# Patient Record
Sex: MALE | Race: WHITE | NOT HISPANIC OR LATINO | URBAN - METROPOLITAN AREA
[De-identification: names, ages, dates, MRNs, and addresses within clinical notes are randomized per-mention and may not be internally consistent; named-entity substitution may affect disease eponyms.]

---

## 2020-03-24 ENCOUNTER — INPATIENT (INPATIENT)
Facility: HOSPITAL | Age: 55
LOS: 9 days | Discharge: HOME | DRG: 177 | End: 2020-04-03
Attending: INTERNAL MEDICINE | Admitting: HOSPITALIST
Payer: COMMERCIAL

## 2020-03-24 VITALS
TEMPERATURE: 100 F | RESPIRATION RATE: 24 BRPM | HEART RATE: 113 BPM | WEIGHT: 315 LBS | HEIGHT: 68 IN | DIASTOLIC BLOOD PRESSURE: 89 MMHG | SYSTOLIC BLOOD PRESSURE: 131 MMHG | OXYGEN SATURATION: 88 %

## 2020-03-24 DIAGNOSIS — Z02.9 ENCOUNTER FOR ADMINISTRATIVE EXAMINATIONS, UNSPECIFIED: ICD-10-CM

## 2020-03-24 DIAGNOSIS — I10 ESSENTIAL (PRIMARY) HYPERTENSION: ICD-10-CM

## 2020-03-24 DIAGNOSIS — J96.01 ACUTE RESPIRATORY FAILURE WITH HYPOXIA: ICD-10-CM

## 2020-03-24 DIAGNOSIS — B34.2 CORONAVIRUS INFECTION, UNSPECIFIED: ICD-10-CM

## 2020-03-24 DIAGNOSIS — J18.9 PNEUMONIA, UNSPECIFIED ORGANISM: ICD-10-CM

## 2020-03-24 DIAGNOSIS — E66.01 MORBID (SEVERE) OBESITY DUE TO EXCESS CALORIES: ICD-10-CM

## 2020-03-24 DIAGNOSIS — R63.8 OTHER SYMPTOMS AND SIGNS CONCERNING FOOD AND FLUID INTAKE: ICD-10-CM

## 2020-03-24 DIAGNOSIS — J12.9 VIRAL PNEUMONIA, UNSPECIFIED: ICD-10-CM

## 2020-03-24 LAB
ALBUMIN SERPL ELPH-MCNC: 3.7 G/DL — SIGNIFICANT CHANGE UP (ref 3.3–5)
ALP SERPL-CCNC: 65 U/L — SIGNIFICANT CHANGE UP (ref 40–120)
ALT FLD-CCNC: 110 U/L — HIGH (ref 10–45)
ANION GAP SERPL CALC-SCNC: 14 MMOL/L — SIGNIFICANT CHANGE UP (ref 5–17)
AST SERPL-CCNC: 74 U/L — HIGH (ref 10–40)
BASOPHILS # BLD AUTO: 0.01 K/UL — SIGNIFICANT CHANGE UP (ref 0–0.2)
BASOPHILS NFR BLD AUTO: 0.2 % — SIGNIFICANT CHANGE UP (ref 0–2)
BILIRUB SERPL-MCNC: 0.5 MG/DL — SIGNIFICANT CHANGE UP (ref 0.2–1.2)
BUN SERPL-MCNC: 9 MG/DL — SIGNIFICANT CHANGE UP (ref 7–23)
CALCIUM SERPL-MCNC: 8.6 MG/DL — SIGNIFICANT CHANGE UP (ref 8.4–10.5)
CHLORIDE SERPL-SCNC: 98 MMOL/L — SIGNIFICANT CHANGE UP (ref 96–108)
CO2 SERPL-SCNC: 24 MMOL/L — SIGNIFICANT CHANGE UP (ref 22–31)
CREAT SERPL-MCNC: 0.57 MG/DL — SIGNIFICANT CHANGE UP (ref 0.5–1.3)
EOSINOPHIL # BLD AUTO: 0 K/UL — SIGNIFICANT CHANGE UP (ref 0–0.5)
EOSINOPHIL NFR BLD AUTO: 0 % — SIGNIFICANT CHANGE UP (ref 0–6)
GAS PNL BLDV: SIGNIFICANT CHANGE UP
GLUCOSE SERPL-MCNC: 123 MG/DL — HIGH (ref 70–99)
HCT VFR BLD CALC: 41.8 % — SIGNIFICANT CHANGE UP (ref 39–50)
HGB BLD-MCNC: 13.6 G/DL — SIGNIFICANT CHANGE UP (ref 13–17)
IMM GRANULOCYTES NFR BLD AUTO: 0.5 % — SIGNIFICANT CHANGE UP (ref 0–1.5)
LYMPHOCYTES # BLD AUTO: 0.76 K/UL — LOW (ref 1–3.3)
LYMPHOCYTES # BLD AUTO: 12.9 % — LOW (ref 13–44)
MCHC RBC-ENTMCNC: 30.4 PG — SIGNIFICANT CHANGE UP (ref 27–34)
MCHC RBC-ENTMCNC: 32.5 GM/DL — SIGNIFICANT CHANGE UP (ref 32–36)
MCV RBC AUTO: 93.3 FL — SIGNIFICANT CHANGE UP (ref 80–100)
MONOCYTES # BLD AUTO: 0.39 K/UL — SIGNIFICANT CHANGE UP (ref 0–0.9)
MONOCYTES NFR BLD AUTO: 6.6 % — SIGNIFICANT CHANGE UP (ref 2–14)
NEUTROPHILS # BLD AUTO: 4.69 K/UL — SIGNIFICANT CHANGE UP (ref 1.8–7.4)
NEUTROPHILS NFR BLD AUTO: 79.8 % — HIGH (ref 43–77)
NRBC # BLD: 0 /100 WBCS — SIGNIFICANT CHANGE UP (ref 0–0)
PLATELET # BLD AUTO: 156 K/UL — SIGNIFICANT CHANGE UP (ref 150–400)
POTASSIUM SERPL-MCNC: 3.5 MMOL/L — SIGNIFICANT CHANGE UP (ref 3.5–5.3)
POTASSIUM SERPL-SCNC: 3.5 MMOL/L — SIGNIFICANT CHANGE UP (ref 3.5–5.3)
PROT SERPL-MCNC: 7.6 G/DL — SIGNIFICANT CHANGE UP (ref 6–8.3)
RBC # BLD: 4.48 M/UL — SIGNIFICANT CHANGE UP (ref 4.2–5.8)
RBC # FLD: 12.8 % — SIGNIFICANT CHANGE UP (ref 10.3–14.5)
SARS-COV-2 RNA SPEC QL NAA+PROBE: DETECTED
SODIUM SERPL-SCNC: 136 MMOL/L — SIGNIFICANT CHANGE UP (ref 135–145)
WBC # BLD: 5.88 K/UL — SIGNIFICANT CHANGE UP (ref 3.8–10.5)
WBC # FLD AUTO: 5.88 K/UL — SIGNIFICANT CHANGE UP (ref 3.8–10.5)

## 2020-03-24 PROCEDURE — 93010 ELECTROCARDIOGRAM REPORT: CPT

## 2020-03-24 PROCEDURE — 71045 X-RAY EXAM CHEST 1 VIEW: CPT | Mod: 26

## 2020-03-24 PROCEDURE — 99223 1ST HOSP IP/OBS HIGH 75: CPT

## 2020-03-24 PROCEDURE — 99285 EMERGENCY DEPT VISIT HI MDM: CPT

## 2020-03-24 RX ORDER — ACETAMINOPHEN 500 MG
650 TABLET ORAL EVERY 6 HOURS
Refills: 0 | Status: DISCONTINUED | OUTPATIENT
Start: 2020-03-24 | End: 2020-04-03

## 2020-03-24 RX ORDER — AZITHROMYCIN 500 MG/1
500 TABLET, FILM COATED ORAL ONCE
Refills: 0 | Status: DISCONTINUED | OUTPATIENT
Start: 2020-03-24 | End: 2020-03-24

## 2020-03-24 RX ORDER — HYDROXYCHLOROQUINE SULFATE 200 MG
TABLET ORAL
Refills: 0 | Status: COMPLETED | OUTPATIENT
Start: 2020-03-25 | End: 2020-03-29

## 2020-03-24 RX ORDER — HYDROXYCHLOROQUINE SULFATE 200 MG
200 TABLET ORAL EVERY 12 HOURS
Refills: 0 | Status: COMPLETED | OUTPATIENT
Start: 2020-03-26 | End: 2020-03-29

## 2020-03-24 RX ORDER — CEFTRIAXONE 500 MG/1
1000 INJECTION, POWDER, FOR SOLUTION INTRAMUSCULAR; INTRAVENOUS ONCE
Refills: 0 | Status: COMPLETED | OUTPATIENT
Start: 2020-03-24 | End: 2020-03-24

## 2020-03-24 RX ORDER — ENOXAPARIN SODIUM 100 MG/ML
40 INJECTION SUBCUTANEOUS EVERY 12 HOURS
Refills: 0 | Status: DISCONTINUED | OUTPATIENT
Start: 2020-03-24 | End: 2020-04-03

## 2020-03-24 RX ORDER — AMLODIPINE BESYLATE 2.5 MG/1
1 TABLET ORAL
Qty: 0 | Refills: 0 | DISCHARGE

## 2020-03-24 RX ORDER — HYDROXYCHLOROQUINE SULFATE 200 MG
400 TABLET ORAL EVERY 12 HOURS
Refills: 0 | Status: COMPLETED | OUTPATIENT
Start: 2020-03-25 | End: 2020-03-25

## 2020-03-24 RX ORDER — ENOXAPARIN SODIUM 100 MG/ML
40 INJECTION SUBCUTANEOUS DAILY
Refills: 0 | Status: DISCONTINUED | OUTPATIENT
Start: 2020-03-24 | End: 2020-03-24

## 2020-03-24 RX ORDER — LANOLIN ALCOHOL/MO/W.PET/CERES
5 CREAM (GRAM) TOPICAL ONCE
Refills: 0 | Status: COMPLETED | OUTPATIENT
Start: 2020-03-24 | End: 2020-03-24

## 2020-03-24 RX ADMIN — ENOXAPARIN SODIUM 40 MILLIGRAM(S): 100 INJECTION SUBCUTANEOUS at 17:59

## 2020-03-24 RX ADMIN — CEFTRIAXONE 100 MILLIGRAM(S): 500 INJECTION, POWDER, FOR SOLUTION INTRAMUSCULAR; INTRAVENOUS at 14:59

## 2020-03-24 RX ADMIN — Medication 5 MILLIGRAM(S): at 22:25

## 2020-03-24 NOTE — H&P ADULT - PROBLEM SELECTOR PLAN 5
-patient w/ morbid obesity, no formal diagnosis of sleep apnea, does not use any type of mask at home

## 2020-03-24 NOTE — ED PROVIDER NOTE - PHYSICAL EXAMINATION
Kenneth Gorman):  GEN: NAD, morbidly obese, awake, eyes open spontaneously  HEENT: NCAT, MMM, Trachea midline, normal conjunctiva, perrl  CHEST/LUNGS: CTAB, noted to desat to 90% when off supplemental O2, bilateral breath sounds  CARDIAC: Non-tachycardic, normal perfusion  ABDOMEN: Soft, NTND, No rebound/guarding  MSK: No edema, no gross deformity of extremities  SKIN: No rashes, no petechiae, no vesicles  NEURO: CN grossly intact, normal coordination, no focal motor or sensory deficits  PSYCH: Alert, appropriate, cooperative, with capacity and insight

## 2020-03-24 NOTE — PATIENT PROFILE ADULT - NSPROHMSYMPCOND_GEN_A_NUR
Patient ambulated to Y61 with a steady gait. Patient changed into gown and connected to monitor. Chart up for ERP.    cardiovascular

## 2020-03-24 NOTE — ED PROVIDER NOTE - OBJECTIVE STATEMENT
Kenneth Gorman): 54y M with PMHX of Morbid Obesity, HTN presents to ED c/o 5 days of low grade fever (TMAX: 100.0F) with generalized malaise, increasing dry cough, and SOB. Notes that he wanted to initially take the day off of work to recuperate, but started experiencing increasing dyspnea this morning. O2 sat 88% in triage. Currently feeling better with 5L nasal cannula. Denies N/V/D.

## 2020-03-24 NOTE — ED ADULT NURSE REASSESSMENT NOTE - NS ED NURSE REASSESS COMMENT FT1
Pt. laying in bed. VSS/NAD. Abx given as per orders. Pt. denies current CP, SOB, N/V/D, numbness, tingling, weakness, dizziness. Waiting for bed upstairs.

## 2020-03-24 NOTE — H&P ADULT - HISTORY OF PRESENT ILLNESS
54y M with medical hx of Morbid Obesity, HTN presents to ED c/o 5 days of low grade fever (TMAX: 100.0F) with generalized malaise, increasing dry cough, and SOB. He states it started with fevers and chills, and yesterday started getting sob prompting him to come to the ED. Denies CP, abdominal pain, palpitations, changes in bowel habits. He has been isolating himself, lives with his wife. 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 He was a former smoker, quit ten years ago, smoked 1PPD x 15 years. Now he does intermittently vape.     Here in the ED: He was hypoxic to 88%, tachypneic, and tachy, tmax 100.2 on 4L NC

## 2020-03-24 NOTE — ED ADULT NURSE NOTE - OBJECTIVE STATEMENT
Pt. is a 55 y/o male c/o cough, chills, malaise, fever, SOB worsening today. States symptoms have been for 5 days. States he has been self isolating at home for a week, wife is also feeling ill. Came in today for worsening SOB. Denies CP, N/V/D, numbness, tingling, dizziness, weakness, headache. A&Ox3. Breathing unlabored and spontaneous. Abdomen soft, nontender, nondistended. Full ROM and strength of extremities. Skin dry and intact. Safety and comfort measures provided.

## 2020-03-24 NOTE — H&P ADULT - NSHPLABSRESULTS_GEN_ALL_CORE
03-24    136  |  98  |  9   ----------------------------<  123<H>  3.5   |  24  |  0.57    Ca    8.6      24 Mar 2020 12:48    TPro  7.6  /  Alb  3.7  /  TBili  0.5  /  DBili  x   /  AST  74<H>  /  ALT  110<H>  /  AlkPhos  65  03-24                                              13.6   5.88  )-----------( 156      ( 24 Mar 2020 12:48 )             41.8     CAPILLARY BLOOD GLUCOSE        Personally reviewed imaging  < from: Xray Chest 1 View- PORTABLE-Urgent (03.24.20 @ 13:26) >    Findings: The heart size cannot be adequately assessed on this single view. There are patchy airspace opacities in the right lung, likely representing multifocal pneumonia. There are no pleural effusions. The hilar and mediastinal structures appear unremarkable.    Impression: Multifocal pneumonia involving primarily the right lung.    < end of copied text >    EKG pending

## 2020-03-24 NOTE — H&P ADULT - ASSESSMENT
54y M with medical hx of Morbid Obesity, HTN presents to ED c/o 5 days of low grade fever (TMAX: 100.0F) with generalized malaise, increasing dry cough, and SOB, admitted w/ acute hypoxia 2/2 COVID 19

## 2020-03-24 NOTE — ED PROVIDER NOTE - CLINICAL SUMMARY MEDICAL DECISION MAKING FREE TEXT BOX
Kenneth Gorman): 54y morbidly obese M, newly hypoxic on RA, with SOB, CHAO, dry cough, and bodyaches. Likely COVID-19. Will obtain CXR, basic labs, nasopharyngeal swab, and reassess. If COVID-19 negative and hypoxia persists, will consider CTA chest to evaluate for PE. Kenneth Gorman): 54y morbidly obese M, newly hypoxic on RA, with SOB, CHAO, dry cough, and bodyaches. Likely COVID-19. Will obtain CXR, basic labs, nasopharyngeal swab, and reassess. If COVID-19 negative and hypoxia persists, will consider CTA chest to evaluate for PE.    ANAID Ta MD: Pt is a 55 y/o male with PMHX of Morbid Obesity, HTN who presents to the ED with c/o 5 days of low grade fever (TMAX: 100.0F) with generalized malaise, increasing dry cough, and SOB. Notes that he wanted to initially take the day off of work to recuperate, but started experiencing increasing dyspnea this morning. O2 sat 88% in triage. Currently feeling better with 5L nasal cannula. Denies N/V/D. Ddx includes, however, is not limited to: covid19, pna, viral syndrome, other. Plan: basic labs, cxr, TBA given hypoxia

## 2020-03-24 NOTE — H&P ADULT - PROBLEM SELECTOR PLAN 1
-Patient w/ COVID 19 confirmed by PCR, CXR w/ opacities in right lung  -will d/c abx, ekg ordered to check qtc, if ok, will start on plaquenil  -inflammatory markers ordered

## 2020-03-24 NOTE — H&P ADULT - NSHPSOCIALHISTORY_GEN_ALL_CORE
Social History:    Marital Status:  ( x  )    (   ) Single    (   )    (  )   Occupation: works for [employer], handles luggage  Lives with: (  ) alone  (  ) children   ( x ) spouse   (  ) parents  (  ) other    Substance Use (street drugs): ( x) never used  (  ) other:  Tobacco Usage:  (   ) never smoked   (  x ) former smoker-as noted in hpi   (   ) current smoker  (     ) pack year  (        ) last cigarette date  Alcohol Usage: none

## 2020-03-24 NOTE — H&P ADULT - NSHPPHYSICALEXAM_GEN_ALL_CORE
Vital Signs Last 24 Hrs  T(C): 37.8 (24 Mar 2020 16:15), Max: 37.9 (24 Mar 2020 11:29)  T(F): 100 (24 Mar 2020 16:15), Max: 100.2 (24 Mar 2020 11:29)  HR: 80 (24 Mar 2020 16:15) (80 - 113)  BP: 125/84 (24 Mar 2020 16:15) (125/84 - 134/92)  BP(mean): --  RR: 20 (24 Mar 2020 16:15) (20 - 24)  SpO2: 94% (24 Mar 2020 16:15) (88% - 95%)  PHYSICAL EXAM:  GENERAL: NAD, obese  EYES: conjunctiva and sclera clear  CHEST/LUNG: diminished breath sounds, difficult to auscultate, no crackles or wheezes noted   HEART: Regular rate and rhythm; No murmurs, rubs, or gallops  ABDOMEN: Soft, Nontender, Nondistended  SKIN: No rashes or lesions  NERVOUS SYSTEM:  Alert & Oriented X3, Good concentration, no focal deficits

## 2020-03-25 LAB
ALBUMIN SERPL ELPH-MCNC: 3.7 G/DL — SIGNIFICANT CHANGE UP (ref 3.3–5)
ALP SERPL-CCNC: 63 U/L — SIGNIFICANT CHANGE UP (ref 40–120)
ALT FLD-CCNC: 107 U/L — HIGH (ref 10–45)
ANION GAP SERPL CALC-SCNC: 15 MMOL/L — SIGNIFICANT CHANGE UP (ref 5–17)
AST SERPL-CCNC: 74 U/L — HIGH (ref 10–40)
BILIRUB SERPL-MCNC: 0.5 MG/DL — SIGNIFICANT CHANGE UP (ref 0.2–1.2)
BUN SERPL-MCNC: 9 MG/DL — SIGNIFICANT CHANGE UP (ref 7–23)
CALCIUM SERPL-MCNC: 8.3 MG/DL — LOW (ref 8.4–10.5)
CHLORIDE SERPL-SCNC: 99 MMOL/L — SIGNIFICANT CHANGE UP (ref 96–108)
CO2 SERPL-SCNC: 24 MMOL/L — SIGNIFICANT CHANGE UP (ref 22–31)
CREAT SERPL-MCNC: 0.56 MG/DL — SIGNIFICANT CHANGE UP (ref 0.5–1.3)
CRP SERPL-MCNC: 6.55 MG/DL — HIGH (ref 0–0.4)
FERRITIN SERPL-MCNC: 1519 NG/ML — HIGH (ref 30–400)
GLUCOSE SERPL-MCNC: 121 MG/DL — HIGH (ref 70–99)
HCT VFR BLD CALC: 41.6 % — SIGNIFICANT CHANGE UP (ref 39–50)
HCV AB S/CO SERPL IA: 0.26 S/CO — SIGNIFICANT CHANGE UP (ref 0–0.99)
HCV AB SERPL-IMP: SIGNIFICANT CHANGE UP
HGB BLD-MCNC: 13.2 G/DL — SIGNIFICANT CHANGE UP (ref 13–17)
LDH SERPL L TO P-CCNC: 473 U/L — HIGH (ref 50–242)
MAGNESIUM SERPL-MCNC: 2 MG/DL — SIGNIFICANT CHANGE UP (ref 1.6–2.6)
MCHC RBC-ENTMCNC: 29.8 PG — SIGNIFICANT CHANGE UP (ref 27–34)
MCHC RBC-ENTMCNC: 31.7 GM/DL — LOW (ref 32–36)
MCV RBC AUTO: 93.9 FL — SIGNIFICANT CHANGE UP (ref 80–100)
NRBC # BLD: 0 /100 WBCS — SIGNIFICANT CHANGE UP (ref 0–0)
PHOSPHATE SERPL-MCNC: 2.3 MG/DL — LOW (ref 2.5–4.5)
PLATELET # BLD AUTO: 165 K/UL — SIGNIFICANT CHANGE UP (ref 150–400)
POTASSIUM SERPL-MCNC: 3.6 MMOL/L — SIGNIFICANT CHANGE UP (ref 3.5–5.3)
POTASSIUM SERPL-SCNC: 3.6 MMOL/L — SIGNIFICANT CHANGE UP (ref 3.5–5.3)
PROT SERPL-MCNC: 7.4 G/DL — SIGNIFICANT CHANGE UP (ref 6–8.3)
RBC # BLD: 4.43 M/UL — SIGNIFICANT CHANGE UP (ref 4.2–5.8)
RBC # FLD: 13 % — SIGNIFICANT CHANGE UP (ref 10.3–14.5)
SODIUM SERPL-SCNC: 138 MMOL/L — SIGNIFICANT CHANGE UP (ref 135–145)
WBC # BLD: 7.38 K/UL — SIGNIFICANT CHANGE UP (ref 3.8–10.5)
WBC # FLD AUTO: 7.38 K/UL — SIGNIFICANT CHANGE UP (ref 3.8–10.5)

## 2020-03-25 PROCEDURE — 99233 SBSQ HOSP IP/OBS HIGH 50: CPT

## 2020-03-25 PROCEDURE — 93010 ELECTROCARDIOGRAM REPORT: CPT

## 2020-03-25 RX ORDER — CALCIUM CARBONATE 500(1250)
1 TABLET ORAL ONCE
Refills: 0 | Status: COMPLETED | OUTPATIENT
Start: 2020-03-25 | End: 2020-03-26

## 2020-03-25 RX ADMIN — Medication 400 MILLIGRAM(S): at 06:53

## 2020-03-25 RX ADMIN — ENOXAPARIN SODIUM 40 MILLIGRAM(S): 100 INJECTION SUBCUTANEOUS at 06:52

## 2020-03-25 RX ADMIN — ENOXAPARIN SODIUM 40 MILLIGRAM(S): 100 INJECTION SUBCUTANEOUS at 17:07

## 2020-03-25 RX ADMIN — Medication 400 MILLIGRAM(S): at 17:07

## 2020-03-25 NOTE — PROGRESS NOTE ADULT - PROBLEM SELECTOR PLAN 1
-Patient w/ COVID 19 confirmed by PCR, CXR w/ opacities in right lung  c/w plaquenil x 5 days  -CRP 6.55 and ferritin 1519  trend daily

## 2020-03-25 NOTE — PROGRESS NOTE ADULT - PROBLEM SELECTOR PLAN 7
Transitions of Care Status:  1.  Name of PCP: Pinky Hong  2.  PCP Contacted on Admission: [ ] Y    [ ] N    3.  PCP contacted at Discharge: [ ] Y    [ ] N    [ ] N/A  4.  Post-Discharge Appointment Date and Location:  5.  Summary of Handoff given to PCP:

## 2020-03-25 NOTE — PROGRESS NOTE ADULT - SUBJECTIVE AND OBJECTIVE BOX
[facility] Division of Hospital Medicine  Pipo Kwong DO  Pager (ERLINDAF, 8A-5P): [phone]  Other Times:  [phone]    Patient is a 54y old  Male who presents with a chief complaint of fever, cough, sob (24 Mar 2020 16:30)      SUBJECTIVE / OVERNIGHT EVENTS:    patient seen and examined  feels ok. sob and coughing improved  no fevers chills or diarrhea  on 4L NC saturating 92%     MEDICATIONS  (STANDING):  calcium carbonate    500 mG (Tums) Chewable 1 Tablet(s) Chew once  enoxaparin Injectable 40 milliGRAM(s) SubCutaneous every 12 hours  hydroxychloroquine 400 milliGRAM(s) Oral every 12 hours    MEDICATIONS  (PRN):  acetaminophen   Tablet .. 650 milliGRAM(s) Oral every 6 hours PRN Temp greater or equal to 38C (100.4F)      CAPILLARY BLOOD GLUCOSE        I&O's Summary    24 Mar 2020 07:01  -  25 Mar 2020 07:00  --------------------------------------------------------  IN: 0 mL / OUT: 250 mL / NET: -250 mL        PHYSICAL EXAM:  Vital Signs Last 24 Hrs  T(C): 36 (25 Mar 2020 12:00), Max: 37.9 (24 Mar 2020 15:00)  T(F): 96.8 (25 Mar 2020 12:00), Max: 100.2 (24 Mar 2020 15:00)  HR: 83 (25 Mar 2020 12:00) (74 - 109)  BP: 119/82 (25 Mar 2020 12:00) (108/75 - 134/92)  BP(mean): --  RR: 18 (25 Mar 2020 12:00) (18 - 22)  SpO2: 90% (25 Mar 2020 12:00) (90% - 95%)      gen: NAD obese on NC  CVS: s1 s2 rrr no murmurs  Chest: decrease BS b/l  Abd: obese, nt nd +BS  Ext: no edema cyanosis or clubbing    LABS:                        13.2   7.38  )-----------( 165      ( 25 Mar 2020 06:53 )             41.6     03-25    138  |  99  |  9   ----------------------------<  121<H>  3.6   |  24  |  0.56    Ca    8.3<L>      25 Mar 2020 06:47  Phos  2.3     03-25  Mg     2.0     03-25    TPro  7.4  /  Alb  3.7  /  TBili  0.5  /  DBili  x   /  AST  74<H>  /  ALT  107<H>  /  AlkPhos  63  03-25                RADIOLOGY & ADDITIONAL TESTS:  Results Reviewed:   Imaging Personally Reviewed:  Electrocardiogram Personally Reviewed:    COORDINATION OF CARE:  Care Discussed with Consultants/Other Providers [Y/N]:  Prior or Outpatient Records Reviewed [Y/N]:  NATANAEL Elias

## 2020-03-26 LAB
ANION GAP SERPL CALC-SCNC: 12 MMOL/L — SIGNIFICANT CHANGE UP (ref 5–17)
BUN SERPL-MCNC: 11 MG/DL — SIGNIFICANT CHANGE UP (ref 7–23)
CALCIUM SERPL-MCNC: 8.3 MG/DL — LOW (ref 8.4–10.5)
CHLORIDE SERPL-SCNC: 97 MMOL/L — SIGNIFICANT CHANGE UP (ref 96–108)
CO2 SERPL-SCNC: 25 MMOL/L — SIGNIFICANT CHANGE UP (ref 22–31)
CREAT SERPL-MCNC: 0.57 MG/DL — SIGNIFICANT CHANGE UP (ref 0.5–1.3)
CRP SERPL-MCNC: 8.68 MG/DL — HIGH (ref 0–0.4)
ERYTHROCYTE [SEDIMENTATION RATE] IN BLOOD: 104 MM/HR — HIGH (ref 0–20)
FERRITIN SERPL-MCNC: 1271 NG/ML — HIGH (ref 30–400)
GLUCOSE SERPL-MCNC: 117 MG/DL — HIGH (ref 70–99)
HCT VFR BLD CALC: 38.5 % — LOW (ref 39–50)
HGB BLD-MCNC: 12.6 G/DL — LOW (ref 13–17)
LDH SERPL L TO P-CCNC: 487 U/L — HIGH (ref 50–242)
MCHC RBC-ENTMCNC: 30.7 PG — SIGNIFICANT CHANGE UP (ref 27–34)
MCHC RBC-ENTMCNC: 32.7 GM/DL — SIGNIFICANT CHANGE UP (ref 32–36)
MCV RBC AUTO: 93.7 FL — SIGNIFICANT CHANGE UP (ref 80–100)
NRBC # BLD: 0 /100 WBCS — SIGNIFICANT CHANGE UP (ref 0–0)
PLATELET # BLD AUTO: 162 K/UL — SIGNIFICANT CHANGE UP (ref 150–400)
POTASSIUM SERPL-MCNC: 3.5 MMOL/L — SIGNIFICANT CHANGE UP (ref 3.5–5.3)
POTASSIUM SERPL-SCNC: 3.5 MMOL/L — SIGNIFICANT CHANGE UP (ref 3.5–5.3)
RBC # BLD: 4.11 M/UL — LOW (ref 4.2–5.8)
RBC # FLD: 12.9 % — SIGNIFICANT CHANGE UP (ref 10.3–14.5)
SODIUM SERPL-SCNC: 134 MMOL/L — LOW (ref 135–145)
WBC # BLD: 6.84 K/UL — SIGNIFICANT CHANGE UP (ref 3.8–10.5)
WBC # FLD AUTO: 6.84 K/UL — SIGNIFICANT CHANGE UP (ref 3.8–10.5)

## 2020-03-26 PROCEDURE — 93010 ELECTROCARDIOGRAM REPORT: CPT

## 2020-03-26 PROCEDURE — 99233 SBSQ HOSP IP/OBS HIGH 50: CPT

## 2020-03-26 RX ORDER — CALCIUM CARBONATE 500(1250)
1 TABLET ORAL ONCE
Refills: 0 | Status: COMPLETED | OUTPATIENT
Start: 2020-03-26 | End: 2020-03-26

## 2020-03-26 RX ORDER — SODIUM CHLORIDE 0.65 %
1 AEROSOL, SPRAY (ML) NASAL
Refills: 0 | Status: DISCONTINUED | OUTPATIENT
Start: 2020-03-26 | End: 2020-04-03

## 2020-03-26 RX ADMIN — Medication 200 MILLIGRAM(S): at 17:57

## 2020-03-26 RX ADMIN — Medication 1 TABLET(S): at 23:37

## 2020-03-26 RX ADMIN — Medication 200 MILLIGRAM(S): at 05:18

## 2020-03-26 RX ADMIN — Medication 1 TABLET(S): at 04:20

## 2020-03-26 RX ADMIN — ENOXAPARIN SODIUM 40 MILLIGRAM(S): 100 INJECTION SUBCUTANEOUS at 17:57

## 2020-03-26 RX ADMIN — ENOXAPARIN SODIUM 40 MILLIGRAM(S): 100 INJECTION SUBCUTANEOUS at 05:18

## 2020-03-26 NOTE — PROGRESS NOTE ADULT - SUBJECTIVE AND OBJECTIVE BOX
[facility] Division of Hospital Medicine  Pipo Kwong DO  Pager (RODOLFO, 8A-5P): [phone]  Other Times:  [phone]    Patient is a 54y old  Male who presents with a chief complaint of fever, cough, sob (25 Mar 2020 12:50)      SUBJECTIVE / OVERNIGHT EVENTS:    patient seen and examined at bedside.  feels well. sob improved. dry cough +  on 4L 02 saturating 94%    MEDICATIONS  (STANDING):  enoxaparin Injectable 40 milliGRAM(s) SubCutaneous every 12 hours  hydroxychloroquine   Oral   hydroxychloroquine 200 milliGRAM(s) Oral every 12 hours    MEDICATIONS  (PRN):  acetaminophen   Tablet .. 650 milliGRAM(s) Oral every 6 hours PRN Temp greater or equal to 38C (100.4F)  sodium chloride 0.65% Nasal 1 Spray(s) Both Nostrils two times a day PRN Nasal Congestion      CAPILLARY BLOOD GLUCOSE        I&O's Summary      PHYSICAL EXAM:  Vital Signs Last 24 Hrs  T(C): 37 (26 Mar 2020 12:09), Max: 37.1 (26 Mar 2020 05:05)  T(F): 98.6 (26 Mar 2020 12:09), Max: 98.8 (26 Mar 2020 05:05)  HR: 77 (26 Mar 2020 12:09) (77 - 86)  BP: 109/74 (26 Mar 2020 12:09) (104/64 - 130/79)  BP(mean): --  RR: 18 (26 Mar 2020 12:09) (18 - 18)  SpO2: 93% (26 Mar 2020 12:09) (89% - 93%)      gen: NAD obese on NC  CVS: s1 s2 rrr no murmurs  Chest: decrease BS b/l  Abd: obese, nt nd +BS  Ext: no edema cyanosis or clubbing      LABS:                        12.6   6.84  )-----------( 162      ( 26 Mar 2020 06:01 )             38.5     03-26    134<L>  |  97  |  11  ----------------------------<  117<H>  3.5   |  25  |  0.57    Ca    8.3<L>      26 Mar 2020 06:01  Phos  2.3     03-25  Mg     2.0     03-25    TPro  7.4  /  Alb  3.7  /  TBili  0.5  /  DBili  x   /  AST  74<H>  /  ALT  107<H>  /  AlkPhos  63  03-25                RADIOLOGY & ADDITIONAL TESTS:  Results Reviewed:   Imaging Personally Reviewed:  Electrocardiogram Personally Reviewed:    COORDINATION OF CARE:  Care Discussed with Consultants/Other Providers [Y/N]:  Prior or Outpatient Records Reviewed [Y/N]:

## 2020-03-26 NOTE — PROGRESS NOTE ADULT - PROBLEM SELECTOR PLAN 1
-Patient w/ COVID 19 confirmed by PCR, CXR w/ opacities in right lung  c/w plaquenil x 5 days  - inflammatory markers downtrending

## 2020-03-27 LAB
ALBUMIN SERPL ELPH-MCNC: 3.2 G/DL — LOW (ref 3.3–5)
ALP SERPL-CCNC: 53 U/L — SIGNIFICANT CHANGE UP (ref 40–120)
ALT FLD-CCNC: 86 U/L — HIGH (ref 10–45)
ANION GAP SERPL CALC-SCNC: 13 MMOL/L — SIGNIFICANT CHANGE UP (ref 5–17)
AST SERPL-CCNC: 57 U/L — HIGH (ref 10–40)
BASOPHILS # BLD AUTO: 0.01 K/UL — SIGNIFICANT CHANGE UP (ref 0–0.2)
BASOPHILS NFR BLD AUTO: 0.2 % — SIGNIFICANT CHANGE UP (ref 0–2)
BILIRUB SERPL-MCNC: 0.5 MG/DL — SIGNIFICANT CHANGE UP (ref 0.2–1.2)
BUN SERPL-MCNC: 11 MG/DL — SIGNIFICANT CHANGE UP (ref 7–23)
CALCIUM SERPL-MCNC: 8.2 MG/DL — LOW (ref 8.4–10.5)
CHLORIDE SERPL-SCNC: 98 MMOL/L — SIGNIFICANT CHANGE UP (ref 96–108)
CK SERPL-CCNC: 210 U/L — HIGH (ref 30–200)
CO2 SERPL-SCNC: 25 MMOL/L — SIGNIFICANT CHANGE UP (ref 22–31)
CREAT SERPL-MCNC: 0.54 MG/DL — SIGNIFICANT CHANGE UP (ref 0.5–1.3)
CRP SERPL-MCNC: 7.77 MG/DL — HIGH (ref 0–0.4)
D DIMER BLD IA.RAPID-MCNC: 482 NG/ML DDU — HIGH
EOSINOPHIL # BLD AUTO: 0.11 K/UL — SIGNIFICANT CHANGE UP (ref 0–0.5)
EOSINOPHIL NFR BLD AUTO: 1.8 % — SIGNIFICANT CHANGE UP (ref 0–6)
ERYTHROCYTE [SEDIMENTATION RATE] IN BLOOD: 111 MM/HR — HIGH (ref 0–20)
FERRITIN SERPL-MCNC: 1601 NG/ML — HIGH (ref 30–400)
GLUCOSE SERPL-MCNC: 115 MG/DL — HIGH (ref 70–99)
HCT VFR BLD CALC: 37.1 % — LOW (ref 39–50)
HGB BLD-MCNC: 12 G/DL — LOW (ref 13–17)
IMM GRANULOCYTES NFR BLD AUTO: 0.6 % — SIGNIFICANT CHANGE UP (ref 0–1.5)
LDH SERPL L TO P-CCNC: 705 U/L — HIGH (ref 50–242)
LYMPHOCYTES # BLD AUTO: 1.15 K/UL — SIGNIFICANT CHANGE UP (ref 1–3.3)
LYMPHOCYTES # BLD AUTO: 18.6 % — SIGNIFICANT CHANGE UP (ref 13–44)
MCHC RBC-ENTMCNC: 30.3 PG — SIGNIFICANT CHANGE UP (ref 27–34)
MCHC RBC-ENTMCNC: 32.3 GM/DL — SIGNIFICANT CHANGE UP (ref 32–36)
MCV RBC AUTO: 93.7 FL — SIGNIFICANT CHANGE UP (ref 80–100)
MONOCYTES # BLD AUTO: 0.51 K/UL — SIGNIFICANT CHANGE UP (ref 0–0.9)
MONOCYTES NFR BLD AUTO: 8.2 % — SIGNIFICANT CHANGE UP (ref 2–14)
NEUTROPHILS # BLD AUTO: 4.37 K/UL — SIGNIFICANT CHANGE UP (ref 1.8–7.4)
NEUTROPHILS NFR BLD AUTO: 70.6 % — SIGNIFICANT CHANGE UP (ref 43–77)
NRBC # BLD: 0 /100 WBCS — SIGNIFICANT CHANGE UP (ref 0–0)
PLATELET # BLD AUTO: 194 K/UL — SIGNIFICANT CHANGE UP (ref 150–400)
POTASSIUM SERPL-MCNC: 3.6 MMOL/L — SIGNIFICANT CHANGE UP (ref 3.5–5.3)
POTASSIUM SERPL-SCNC: 3.6 MMOL/L — SIGNIFICANT CHANGE UP (ref 3.5–5.3)
PROT SERPL-MCNC: 7 G/DL — SIGNIFICANT CHANGE UP (ref 6–8.3)
RBC # BLD: 3.96 M/UL — LOW (ref 4.2–5.8)
RBC # FLD: 13.1 % — SIGNIFICANT CHANGE UP (ref 10.3–14.5)
SODIUM SERPL-SCNC: 136 MMOL/L — SIGNIFICANT CHANGE UP (ref 135–145)
WBC # BLD: 6.19 K/UL — SIGNIFICANT CHANGE UP (ref 3.8–10.5)
WBC # FLD AUTO: 6.19 K/UL — SIGNIFICANT CHANGE UP (ref 3.8–10.5)

## 2020-03-27 PROCEDURE — 99233 SBSQ HOSP IP/OBS HIGH 50: CPT

## 2020-03-27 RX ADMIN — ENOXAPARIN SODIUM 40 MILLIGRAM(S): 100 INJECTION SUBCUTANEOUS at 05:25

## 2020-03-27 RX ADMIN — Medication 200 MILLIGRAM(S): at 17:19

## 2020-03-27 RX ADMIN — ENOXAPARIN SODIUM 40 MILLIGRAM(S): 100 INJECTION SUBCUTANEOUS at 17:19

## 2020-03-27 RX ADMIN — Medication 200 MILLIGRAM(S): at 05:26

## 2020-03-27 NOTE — CHART NOTE - NSCHARTNOTEFT_GEN_A_CORE
Upon Nutritional Assessment by the Registered Dietitian your patient was determined to meet criteria / has evidence of the following diagnosis/diagnoses:          [ ]  Mild Protein Calorie Malnutrition        [ ]  Moderate Protein Calorie Malnutrition        [ ] Severe Protein Calorie Malnutrition        [ ] Unspecified Protein Calorie Malnutrition        [ ] Underweight / BMI <19        [x] Morbid Obesity / BMI > 40      Findings as based on:  [x] Comprehensive nutrition assessment   [ ] Nutrition Focused Physical Exam  [ ] Other:       Nutrition Plan/Recommendations:    1) Continue Regular diet with no therapeutic restrictions (monitor need for Low Sodium restriction as needed)   2) Encouraged continued good PO intake as tolerated, pt made aware RD remains available   3) Monitor PO intake, weight, labs, skin, GI status, diet      PROVIDER Section:     By signing this assessment you are acknowledging and agree with the diagnosis/diagnoses assigned by the Registered Dietitian    Comments:

## 2020-03-27 NOTE — DIETITIAN INITIAL EVALUATION ADULT. - ADD RECOMMEND
1) Continue Regular diet with no therapeutic restrictions (monitor need for Low Sodium restriction as needed) 2) Encouraged continued good PO intake as tolerated, pt made aware RD remains available 3) Monitor PO intake, weight, labs, skin, GI status, diet

## 2020-03-27 NOTE — DIETITIAN INITIAL EVALUATION ADULT. - OTHER INFO
Unable to conduct a face-to-face interview at this time due to limited contact restrictions related to pt's medical condition and isolation precautions.    Spoke to patient over phone. Pt reports good appetite and intake, consuming 85% of meals. Denies nausea/vomiting/diarrhea/constipation. Last BM 3/25 as per flowsheets. Denies difficulties chewing/swallowing. Confirmed NKFA.     Pt reports slightly decreased intake PTA for a few days, now improved in-house. Unsure of recent weight changes, states UBW ~330 pounds (consistent with current dosing weight 329.3 pounds). Denies vitamin/mineral supplementation PTA.     Encouraged continued good PO intake as tolerated. Pt with no food preferences at this time. Pt with no further nutrition-related questions or concerns at this time. Further diet education deferred given pt with recently improved appetite/acute illness. Pt made aware RD remains available.

## 2020-03-27 NOTE — PROGRESS NOTE ADULT - SUBJECTIVE AND OBJECTIVE BOX
[facility] Division of Hospital Medicine  Pipo Kwong DO  Pager (JEFFERY-F, 8A-5P): [phone]  Other Times:  [phone]    Patient is a 54y old  Male who presents with a chief complaint of fever, cough, sob (26 Mar 2020 14:08)      SUBJECTIVE / OVERNIGHT EVENTS:  patient seen and examined at bedside.  feels ok. no sob, cough.  on 4L NC 93%    MEDICATIONS  (STANDING):  enoxaparin Injectable 40 milliGRAM(s) SubCutaneous every 12 hours  hydroxychloroquine   Oral   hydroxychloroquine 200 milliGRAM(s) Oral every 12 hours    MEDICATIONS  (PRN):  acetaminophen   Tablet .. 650 milliGRAM(s) Oral every 6 hours PRN Temp greater or equal to 38C (100.4F)  sodium chloride 0.65% Nasal 1 Spray(s) Both Nostrils two times a day PRN Nasal Congestion      CAPILLARY BLOOD GLUCOSE        I&O's Summary      PHYSICAL EXAM:  Vital Signs Last 24 Hrs  T(C): 37.3 (27 Mar 2020 13:51), Max: 37.3 (27 Mar 2020 13:51)  T(F): 99.1 (27 Mar 2020 13:51), Max: 99.1 (27 Mar 2020 13:51)  HR: 80 (27 Mar 2020 13:51) (75 - 80)  BP: 108/72 (27 Mar 2020 13:51) (102/68 - 110/73)  BP(mean): --  RR: 18 (27 Mar 2020 13:51) (18 - 22)  SpO2: 92% (27 Mar 2020 13:51) (89% - 94%)      gen: NAD obese on NC  CVS: s1 s2 rrr no murmurs  Chest: decrease BS b/l  Abd: obese, nt nd +BS  Ext: no edema cyanosis or clubbing    LABS:                        12.0   6.19  )-----------( 194      ( 27 Mar 2020 06:40 )             37.1     03-27    136  |  98  |  11  ----------------------------<  115<H>  3.6   |  25  |  0.54    Ca    8.2<L>      27 Mar 2020 06:40    TPro  7.0  /  Alb  3.2<L>  /  TBili  0.5  /  DBili  x   /  AST  57<H>  /  ALT  86<H>  /  AlkPhos  53  03-27      CARDIAC MARKERS ( 27 Mar 2020 11:35 )  x     / x     / 210 U/L / x     / x                RADIOLOGY & ADDITIONAL TESTS:  Results Reviewed:   Imaging Personally Reviewed:  Electrocardiogram Personally Reviewed:    COORDINATION OF CARE:  Care Discussed with Consultants/Other Providers [Y/N]:  Prior or Outpatient Records Reviewed [Y/N]:  Np sajni

## 2020-03-27 NOTE — DIETITIAN INITIAL EVALUATION ADULT. - REASON INDICATOR FOR ASSESSMENT
Patient seen for BMI > 40. Source: comprehensive chart review, patient. Pt is a 55 yo male with PMH of HTN, who presented with low grade fever, generalized malaise, dry cough, and SOB, admitted 3/24 with acute respiratory failure with hypoxia secondary to COVID-19.

## 2020-03-27 NOTE — DIETITIAN INITIAL EVALUATION ADULT. - PHYSICAL APPEARANCE
Unable to conduct nutrition-focused physical exam at this time due to limited contact restrictions related to pt's medical condition and isolation precautions/other (specify) Ht: 68 inches (172.72 cm) Wt: 329.3 pounds (149.7 kg)  BMI: 50.2 kg/m2  IBW: 154 pounds +/-10% %IBW: 214%  Skin: no pressure injuries per flowsheets   Edema: no edema per flowsheets

## 2020-03-28 DIAGNOSIS — K59.00 CONSTIPATION, UNSPECIFIED: ICD-10-CM

## 2020-03-28 LAB
ALBUMIN SERPL ELPH-MCNC: 3 G/DL — LOW (ref 3.3–5)
ALP SERPL-CCNC: 50 U/L — SIGNIFICANT CHANGE UP (ref 40–120)
ALT FLD-CCNC: 80 U/L — HIGH (ref 10–45)
ANION GAP SERPL CALC-SCNC: 17 MMOL/L — SIGNIFICANT CHANGE UP (ref 5–17)
AST SERPL-CCNC: 51 U/L — HIGH (ref 10–40)
BILIRUB SERPL-MCNC: 0.5 MG/DL — SIGNIFICANT CHANGE UP (ref 0.2–1.2)
BUN SERPL-MCNC: 11 MG/DL — SIGNIFICANT CHANGE UP (ref 7–23)
CALCIUM SERPL-MCNC: 8.1 MG/DL — LOW (ref 8.4–10.5)
CHLORIDE SERPL-SCNC: 100 MMOL/L — SIGNIFICANT CHANGE UP (ref 96–108)
CK SERPL-CCNC: 157 U/L — SIGNIFICANT CHANGE UP (ref 30–200)
CO2 SERPL-SCNC: 22 MMOL/L — SIGNIFICANT CHANGE UP (ref 22–31)
CREAT SERPL-MCNC: 0.53 MG/DL — SIGNIFICANT CHANGE UP (ref 0.5–1.3)
CRP SERPL-MCNC: 6.41 MG/DL — HIGH (ref 0–0.4)
D DIMER BLD IA.RAPID-MCNC: 606 NG/ML DDU — HIGH
ERYTHROCYTE [SEDIMENTATION RATE] IN BLOOD: 119 MM/HR — HIGH (ref 0–20)
FERRITIN SERPL-MCNC: 1415 NG/ML — HIGH (ref 30–400)
GLUCOSE SERPL-MCNC: 107 MG/DL — HIGH (ref 70–99)
HCT VFR BLD CALC: 38.6 % — LOW (ref 39–50)
HGB BLD-MCNC: 12.1 G/DL — LOW (ref 13–17)
LDH SERPL L TO P-CCNC: 447 U/L — HIGH (ref 50–242)
MCHC RBC-ENTMCNC: 30 PG — SIGNIFICANT CHANGE UP (ref 27–34)
MCHC RBC-ENTMCNC: 31.3 GM/DL — LOW (ref 32–36)
MCV RBC AUTO: 95.5 FL — SIGNIFICANT CHANGE UP (ref 80–100)
NRBC # BLD: 0 /100 WBCS — SIGNIFICANT CHANGE UP (ref 0–0)
PLATELET # BLD AUTO: 205 K/UL — SIGNIFICANT CHANGE UP (ref 150–400)
POTASSIUM SERPL-MCNC: 3.8 MMOL/L — SIGNIFICANT CHANGE UP (ref 3.5–5.3)
POTASSIUM SERPL-SCNC: 3.8 MMOL/L — SIGNIFICANT CHANGE UP (ref 3.5–5.3)
PROT SERPL-MCNC: 7 G/DL — SIGNIFICANT CHANGE UP (ref 6–8.3)
RBC # BLD: 4.04 M/UL — LOW (ref 4.2–5.8)
RBC # FLD: 13 % — SIGNIFICANT CHANGE UP (ref 10.3–14.5)
SODIUM SERPL-SCNC: 139 MMOL/L — SIGNIFICANT CHANGE UP (ref 135–145)
WBC # BLD: 6.42 K/UL — SIGNIFICANT CHANGE UP (ref 3.8–10.5)
WBC # FLD AUTO: 6.42 K/UL — SIGNIFICANT CHANGE UP (ref 3.8–10.5)

## 2020-03-28 PROCEDURE — 93010 ELECTROCARDIOGRAM REPORT: CPT

## 2020-03-28 PROCEDURE — 99233 SBSQ HOSP IP/OBS HIGH 50: CPT

## 2020-03-28 RX ORDER — POLYETHYLENE GLYCOL 3350 17 G/17G
17 POWDER, FOR SOLUTION ORAL DAILY
Refills: 0 | Status: DISCONTINUED | OUTPATIENT
Start: 2020-03-28 | End: 2020-04-03

## 2020-03-28 RX ADMIN — ENOXAPARIN SODIUM 40 MILLIGRAM(S): 100 INJECTION SUBCUTANEOUS at 17:21

## 2020-03-28 RX ADMIN — ENOXAPARIN SODIUM 40 MILLIGRAM(S): 100 INJECTION SUBCUTANEOUS at 07:06

## 2020-03-28 RX ADMIN — Medication 200 MILLIGRAM(S): at 17:21

## 2020-03-28 RX ADMIN — POLYETHYLENE GLYCOL 3350 17 GRAM(S): 17 POWDER, FOR SOLUTION ORAL at 16:55

## 2020-03-28 RX ADMIN — Medication 200 MILLIGRAM(S): at 07:06

## 2020-03-28 NOTE — PROGRESS NOTE ADULT - SUBJECTIVE AND OBJECTIVE BOX
Patient is a 54y old  Male who presents with a chief complaint of fever, cough, sob (27 Mar 2020 14:09)      SUBJECTIVE / OVERNIGHT EVENTS: no acute events overnight     MEDICATIONS  (STANDING):  enoxaparin Injectable 40 milliGRAM(s) SubCutaneous every 12 hours  hydroxychloroquine   Oral   hydroxychloroquine 200 milliGRAM(s) Oral every 12 hours    MEDICATIONS  (PRN):  acetaminophen   Tablet .. 650 milliGRAM(s) Oral every 6 hours PRN Temp greater or equal to 38C (100.4F)  sodium chloride 0.65% Nasal 1 Spray(s) Both Nostrils two times a day PRN Nasal Congestion      Vital Signs Last 24 Hrs  T(C): 37.2 (28 Mar 2020 13:12), Max: 37.3 (28 Mar 2020 05:26)  T(F): 99 (28 Mar 2020 13:12), Max: 99.1 (28 Mar 2020 05:26)  HR: 73 (28 Mar 2020 13:12) (73 - 82)  BP: 106/73 (28 Mar 2020 13:12) (104/69 - 107/72)  BP(mean): --  RR: 18 (28 Mar 2020 13:12) (18 - 18)  SpO2: 93% (28 Mar 2020 13:12) (89% - 97%)  CAPILLARY BLOOD GLUCOSE        I&O's Summary      PHYSICAL EXAM:  GENERAL: NAD  EYES: conjunctiva and sclera clear  CHEST/LUNG:  decreased lung sounds b/l  HEART: +s1/S2, reg   ABDOMEN: Soft, Nontender, Nondistended  PSYCH: AAOx3      LABS:                        12.1   6.42  )-----------( 205      ( 28 Mar 2020 07:39 )             38.6     03-28    139  |  100  |  11  ----------------------------<  107<H>  3.8   |  22  |  0.53    Ca    8.1<L>      28 Mar 2020 07:40    TPro  7.0  /  Alb  3.0<L>  /  TBili  0.5  /  DBili  x   /  AST  51<H>  /  ALT  80<H>  /  AlkPhos  50  03-28      CARDIAC MARKERS ( 28 Mar 2020 07:40 )  x     / x     / 157 U/L / x     / x      CARDIAC MARKERS ( 27 Mar 2020 11:35 )  x     / x     / 210 U/L / x     / x

## 2020-03-29 LAB
ALBUMIN SERPL ELPH-MCNC: 3 G/DL — LOW (ref 3.3–5)
ALP SERPL-CCNC: 51 U/L — SIGNIFICANT CHANGE UP (ref 40–120)
ALT FLD-CCNC: 78 U/L — HIGH (ref 10–45)
ANION GAP SERPL CALC-SCNC: 10 MMOL/L — SIGNIFICANT CHANGE UP (ref 5–17)
AST SERPL-CCNC: 44 U/L — HIGH (ref 10–40)
BILIRUB SERPL-MCNC: 0.5 MG/DL — SIGNIFICANT CHANGE UP (ref 0.2–1.2)
BUN SERPL-MCNC: 7 MG/DL — SIGNIFICANT CHANGE UP (ref 7–23)
CALCIUM SERPL-MCNC: 8.1 MG/DL — LOW (ref 8.4–10.5)
CHLORIDE SERPL-SCNC: 99 MMOL/L — SIGNIFICANT CHANGE UP (ref 96–108)
CO2 SERPL-SCNC: 26 MMOL/L — SIGNIFICANT CHANGE UP (ref 22–31)
CREAT SERPL-MCNC: 0.52 MG/DL — SIGNIFICANT CHANGE UP (ref 0.5–1.3)
CRP SERPL-MCNC: 7.16 MG/DL — HIGH (ref 0–0.4)
GLUCOSE SERPL-MCNC: 111 MG/DL — HIGH (ref 70–99)
HCT VFR BLD CALC: 36.4 % — LOW (ref 39–50)
HGB BLD-MCNC: 11.6 G/DL — LOW (ref 13–17)
MCHC RBC-ENTMCNC: 30.1 PG — SIGNIFICANT CHANGE UP (ref 27–34)
MCHC RBC-ENTMCNC: 31.9 GM/DL — LOW (ref 32–36)
MCV RBC AUTO: 94.5 FL — SIGNIFICANT CHANGE UP (ref 80–100)
NRBC # BLD: 0 /100 WBCS — SIGNIFICANT CHANGE UP (ref 0–0)
PLATELET # BLD AUTO: 233 K/UL — SIGNIFICANT CHANGE UP (ref 150–400)
POTASSIUM SERPL-MCNC: 3.9 MMOL/L — SIGNIFICANT CHANGE UP (ref 3.5–5.3)
POTASSIUM SERPL-SCNC: 3.9 MMOL/L — SIGNIFICANT CHANGE UP (ref 3.5–5.3)
PROT SERPL-MCNC: 7.4 G/DL — SIGNIFICANT CHANGE UP (ref 6–8.3)
RBC # BLD: 3.85 M/UL — LOW (ref 4.2–5.8)
RBC # FLD: 12.7 % — SIGNIFICANT CHANGE UP (ref 10.3–14.5)
SODIUM SERPL-SCNC: 135 MMOL/L — SIGNIFICANT CHANGE UP (ref 135–145)
WBC # BLD: 6.34 K/UL — SIGNIFICANT CHANGE UP (ref 3.8–10.5)
WBC # FLD AUTO: 6.34 K/UL — SIGNIFICANT CHANGE UP (ref 3.8–10.5)

## 2020-03-29 PROCEDURE — 93010 ELECTROCARDIOGRAM REPORT: CPT

## 2020-03-29 PROCEDURE — 99233 SBSQ HOSP IP/OBS HIGH 50: CPT

## 2020-03-29 RX ORDER — ASCORBIC ACID 60 MG
1000 TABLET,CHEWABLE ORAL DAILY
Refills: 0 | Status: DISCONTINUED | OUTPATIENT
Start: 2020-03-29 | End: 2020-04-03

## 2020-03-29 RX ORDER — ZINC SULFATE TAB 220 MG (50 MG ZINC EQUIVALENT) 220 (50 ZN) MG
220 TAB ORAL DAILY
Refills: 0 | Status: DISCONTINUED | OUTPATIENT
Start: 2020-03-29 | End: 2020-04-03

## 2020-03-29 RX ADMIN — Medication 200 MILLIGRAM(S): at 06:02

## 2020-03-29 RX ADMIN — Medication 1000 MILLIGRAM(S): at 18:07

## 2020-03-29 RX ADMIN — POLYETHYLENE GLYCOL 3350 17 GRAM(S): 17 POWDER, FOR SOLUTION ORAL at 11:07

## 2020-03-29 RX ADMIN — ZINC SULFATE TAB 220 MG (50 MG ZINC EQUIVALENT) 220 MILLIGRAM(S): 220 (50 ZN) TAB at 20:28

## 2020-03-29 RX ADMIN — ENOXAPARIN SODIUM 40 MILLIGRAM(S): 100 INJECTION SUBCUTANEOUS at 17:27

## 2020-03-29 RX ADMIN — ENOXAPARIN SODIUM 40 MILLIGRAM(S): 100 INJECTION SUBCUTANEOUS at 06:02

## 2020-03-29 RX ADMIN — Medication 200 MILLIGRAM(S): at 17:28

## 2020-03-29 NOTE — PROGRESS NOTE ADULT - PROBLEM SELECTOR PLAN 1
-Patient w/ COVID 19 confirmed by PCR, CXR w/ opacities in right lung  c/w plaquenil x 5 days  - inflammatory markers downtrending  - if patient is not improving tomorrow, or if oxygenation requirements increase, may be candidate for trial study. Discussed with ID today, to re-eval tomorrow.

## 2020-03-29 NOTE — PROGRESS NOTE ADULT - SUBJECTIVE AND OBJECTIVE BOX
Patient is a 54y old  Male who presents with a chief complaint of fever, cough, sob (28 Mar 2020 13:52)    SUBJECTIVE / OVERNIGHT EVENTS: no acute events overnight, pt had BM yesterday    MEDICATIONS  (STANDING):  enoxaparin Injectable 40 milliGRAM(s) SubCutaneous every 12 hours  hydroxychloroquine   Oral   hydroxychloroquine 200 milliGRAM(s) Oral every 12 hours  polyethylene glycol 3350 17 Gram(s) Oral daily    MEDICATIONS  (PRN):  acetaminophen   Tablet .. 650 milliGRAM(s) Oral every 6 hours PRN Temp greater or equal to 38C (100.4F)  sodium chloride 0.65% Nasal 1 Spray(s) Both Nostrils two times a day PRN Nasal Congestion      Vital Signs Last 24 Hrs  T(C): 36.9 (29 Mar 2020 11:09), Max: 36.9 (28 Mar 2020 20:55)  T(F): 98.4 (29 Mar 2020 11:09), Max: 98.4 (28 Mar 2020 20:55)  HR: 77 (29 Mar 2020 11:09) (72 - 100)  BP: 103/70 (29 Mar 2020 11:09) (103/70 - 123/79)  BP(mean): --  RR: 17 (29 Mar 2020 11:09) (17 - 18)  SpO2: 98% (29 Mar 2020 11:09) (95% - 98%)  CAPILLARY BLOOD GLUCOSE        I&O's Summary    29 Mar 2020 07:01  -  29 Mar 2020 16:23  --------------------------------------------------------  IN: 240 mL / OUT: 0 mL / NET: 240 mL      PHYSICAL EXAM:  GENERAL: NAD  EYES: conjunctiva and sclera clear  NECK: Supple, No JVD  CHEST/LUNG: Clear, no wheezing, decreased air movement b/l  HEART: +S1/S2, reg   ABDOMEN: Soft, Nontender  PSYCH: AAOx3    LABS:                        11.6   6.34  )-----------( 233      ( 29 Mar 2020 07:50 )             36.4     03-29    135  |  99  |  7   ----------------------------<  111<H>  3.9   |  26  |  0.52    Ca    8.1<L>      29 Mar 2020 07:50    TPro  7.4  /  Alb  3.0<L>  /  TBili  0.5  /  DBili  x   /  AST  44<H>  /  ALT  78<H>  /  AlkPhos  51  03-29      CARDIAC MARKERS ( 28 Mar 2020 07:40 )  x     / x     / 157 U/L / x     / x

## 2020-03-30 LAB
ALBUMIN SERPL ELPH-MCNC: 3.1 G/DL — LOW (ref 3.3–5)
ALP SERPL-CCNC: 48 U/L — SIGNIFICANT CHANGE UP (ref 40–120)
ALT FLD-CCNC: 75 U/L — HIGH (ref 10–45)
ANION GAP SERPL CALC-SCNC: 12 MMOL/L — SIGNIFICANT CHANGE UP (ref 5–17)
AST SERPL-CCNC: 41 U/L — HIGH (ref 10–40)
BASOPHILS # BLD AUTO: 0.02 K/UL — SIGNIFICANT CHANGE UP (ref 0–0.2)
BASOPHILS NFR BLD AUTO: 0.4 % — SIGNIFICANT CHANGE UP (ref 0–2)
BILIRUB SERPL-MCNC: 0.4 MG/DL — SIGNIFICANT CHANGE UP (ref 0.2–1.2)
BUN SERPL-MCNC: 11 MG/DL — SIGNIFICANT CHANGE UP (ref 7–23)
CALCIUM SERPL-MCNC: 8.4 MG/DL — SIGNIFICANT CHANGE UP (ref 8.4–10.5)
CHLORIDE SERPL-SCNC: 100 MMOL/L — SIGNIFICANT CHANGE UP (ref 96–108)
CO2 SERPL-SCNC: 27 MMOL/L — SIGNIFICANT CHANGE UP (ref 22–31)
CREAT SERPL-MCNC: 0.55 MG/DL — SIGNIFICANT CHANGE UP (ref 0.5–1.3)
CRP SERPL-MCNC: 6.2 MG/DL — HIGH (ref 0–0.4)
EOSINOPHIL # BLD AUTO: 0.27 K/UL — SIGNIFICANT CHANGE UP (ref 0–0.5)
EOSINOPHIL NFR BLD AUTO: 4.9 % — SIGNIFICANT CHANGE UP (ref 0–6)
GLUCOSE SERPL-MCNC: 102 MG/DL — HIGH (ref 70–99)
HCT VFR BLD CALC: 36.3 % — LOW (ref 39–50)
HGB BLD-MCNC: 11.5 G/DL — LOW (ref 13–17)
IMM GRANULOCYTES NFR BLD AUTO: 0.4 % — SIGNIFICANT CHANGE UP (ref 0–1.5)
LYMPHOCYTES # BLD AUTO: 1.51 K/UL — SIGNIFICANT CHANGE UP (ref 1–3.3)
LYMPHOCYTES # BLD AUTO: 27.6 % — SIGNIFICANT CHANGE UP (ref 13–44)
MCHC RBC-ENTMCNC: 30.2 PG — SIGNIFICANT CHANGE UP (ref 27–34)
MCHC RBC-ENTMCNC: 31.7 GM/DL — LOW (ref 32–36)
MCV RBC AUTO: 95.3 FL — SIGNIFICANT CHANGE UP (ref 80–100)
MONOCYTES # BLD AUTO: 0.57 K/UL — SIGNIFICANT CHANGE UP (ref 0–0.9)
MONOCYTES NFR BLD AUTO: 10.4 % — SIGNIFICANT CHANGE UP (ref 2–14)
NEUTROPHILS # BLD AUTO: 3.09 K/UL — SIGNIFICANT CHANGE UP (ref 1.8–7.4)
NEUTROPHILS NFR BLD AUTO: 56.3 % — SIGNIFICANT CHANGE UP (ref 43–77)
NRBC # BLD: 0 /100 WBCS — SIGNIFICANT CHANGE UP (ref 0–0)
PLATELET # BLD AUTO: 241 K/UL — SIGNIFICANT CHANGE UP (ref 150–400)
POTASSIUM SERPL-MCNC: 4 MMOL/L — SIGNIFICANT CHANGE UP (ref 3.5–5.3)
POTASSIUM SERPL-SCNC: 4 MMOL/L — SIGNIFICANT CHANGE UP (ref 3.5–5.3)
PROT SERPL-MCNC: 7.4 G/DL — SIGNIFICANT CHANGE UP (ref 6–8.3)
RBC # BLD: 3.81 M/UL — LOW (ref 4.2–5.8)
RBC # FLD: 12.8 % — SIGNIFICANT CHANGE UP (ref 10.3–14.5)
SODIUM SERPL-SCNC: 139 MMOL/L — SIGNIFICANT CHANGE UP (ref 135–145)
WBC # BLD: 5.48 K/UL — SIGNIFICANT CHANGE UP (ref 3.8–10.5)
WBC # FLD AUTO: 5.48 K/UL — SIGNIFICANT CHANGE UP (ref 3.8–10.5)

## 2020-03-30 PROCEDURE — 93010 ELECTROCARDIOGRAM REPORT: CPT

## 2020-03-30 RX ADMIN — ENOXAPARIN SODIUM 40 MILLIGRAM(S): 100 INJECTION SUBCUTANEOUS at 18:09

## 2020-03-30 RX ADMIN — ENOXAPARIN SODIUM 40 MILLIGRAM(S): 100 INJECTION SUBCUTANEOUS at 05:04

## 2020-03-30 RX ADMIN — Medication 1000 MILLIGRAM(S): at 12:30

## 2020-03-30 RX ADMIN — ZINC SULFATE TAB 220 MG (50 MG ZINC EQUIVALENT) 220 MILLIGRAM(S): 220 (50 ZN) TAB at 12:30

## 2020-03-30 NOTE — PROGRESS NOTE ADULT - ASSESSMENT
54y M with medical hx of Morbid Obesity, HTN presents to ED c/o 5 days of low grade fever (TMAX: 100.0F) with generalized malaise, increasing dry cough, and SOB, admitted w/ acute hypoxia 2/2 COVID 19     Problem/Plan - 1:  ·  Problem: Coronavirus infection.    - Patient w/ COVID 19 confirmed by PCR, CXR w/ opacities in right lung  - c/w plaquenil x 5 days  - if patient is not improving tomorrow, or if oxygenation requirements increase, may be candidate for trial study. Discussed with ID today, to re-eval tomorrow.      Problem/Plan - 2:  ·  Problem: Acute respiratory failure with hypoxia.     -currently on 5L NC, will continue to monitor, wean as needed  -management as above.      Problem/Plan - 3:  ·  Problem: Pneumonia, viral.    -management as above.      Problem/Plan - 4:  ·  Problem: Essential hypertension.    -will hold home anti-htn for now, BPs stable.      Problem/Plan - 5:  ·  Problem: Constipation.  Plan: -bowel regimen.      Problem/Plan - 6:  Problem: Morbid obesity with BMI of 50.0-59.9, adult. Plan: -patient w/ morbid obesity, no formal diagnosis of sleep apnea, does not use any type of mask at home.     Problem/Plan - 7:  ·  Problem: Nutrition, metabolism, and development symptoms.  Plan: -regular diet  -lovenox.

## 2020-03-30 NOTE — PROGRESS NOTE ADULT - SUBJECTIVE AND OBJECTIVE BOX
Patient is a 54y old  Male who presents with a chief complaint of fever, cough, sob (28 Mar 2020 13:52)    SUBJECTIVE / OVERNIGHT EVENTS: no acute events overnight, reports feeling better and able to ambulate without dyspnea while on NC to the bathroom to brush his teeth.     MEDICATIONS  (STANDING):  ascorbic acid 1000 milliGRAM(s) Oral daily  enoxaparin Injectable 40 milliGRAM(s) SubCutaneous every 12 hours  polyethylene glycol 3350 17 Gram(s) Oral daily  zinc sulfate 220 milliGRAM(s) Oral daily    MEDICATIONS  (PRN):  acetaminophen   Tablet .. 650 milliGRAM(s) Oral every 6 hours PRN Temp greater or equal to 38C (100.4F)  sodium chloride 0.65% Nasal 1 Spray(s) Both Nostrils two times a day PRN Nasal Congestion    Vital Signs Last 24 Hrs  ICU Vital Signs Last 24 Hrs  T(C): 36.8 (30 Mar 2020 12:19), Max: 36.9 (29 Mar 2020 20:14)  T(F): 98.2 (30 Mar 2020 12:19), Max: 98.4 (29 Mar 2020 20:14)  HR: 74 (30 Mar 2020 12:19) (74 - 102)  BP: 104/69 (30 Mar 2020 12:19) (101/67 - 113/80)  BP(mean): --  ABP: --  ABP(mean): --  RR: 19 (30 Mar 2020 12:19) (19 - 20)  SpO2: 96% (30 Mar 2020 12:19) (93% - 96%)      PHYSICAL EXAM:  GENERAL: NAD  EYES: conjunctiva and sclera clear  NECK: Supple, No JVD  CHEST/LUNG: Clear, no wheezing, decreased air movement b/l  HEART: +S1/S2, reg   ABDOMEN: Soft, Nontender  PSYCH: AAOx3    LABS:                                    11.5   5.48  )-----------( 241      ( 30 Mar 2020 07:12 )             36.3       03-30    139  |  100  |  11  ----------------------------<  102<H>  4.0   |  27  |  0.55    Ca    8.4      30 Mar 2020 06:59    TPro  7.4  /  Alb  3.1<L>  /  TBili  0.4  /  DBili  x   /  AST  41<H>  /  ALT  75<H>  /  AlkPhos  48  03-30

## 2020-03-31 LAB
ALBUMIN SERPL ELPH-MCNC: 3.1 G/DL — LOW (ref 3.3–5)
ALP SERPL-CCNC: 52 U/L — SIGNIFICANT CHANGE UP (ref 40–120)
ALT FLD-CCNC: 80 U/L — HIGH (ref 10–45)
ANION GAP SERPL CALC-SCNC: 12 MMOL/L — SIGNIFICANT CHANGE UP (ref 5–17)
AST SERPL-CCNC: 43 U/L — HIGH (ref 10–40)
BILIRUB SERPL-MCNC: 0.4 MG/DL — SIGNIFICANT CHANGE UP (ref 0.2–1.2)
BUN SERPL-MCNC: 14 MG/DL — SIGNIFICANT CHANGE UP (ref 7–23)
CALCIUM SERPL-MCNC: 8.6 MG/DL — SIGNIFICANT CHANGE UP (ref 8.4–10.5)
CHLORIDE SERPL-SCNC: 102 MMOL/L — SIGNIFICANT CHANGE UP (ref 96–108)
CO2 SERPL-SCNC: 26 MMOL/L — SIGNIFICANT CHANGE UP (ref 22–31)
CREAT SERPL-MCNC: 0.62 MG/DL — SIGNIFICANT CHANGE UP (ref 0.5–1.3)
CRP SERPL-MCNC: 4.19 MG/DL — HIGH (ref 0–0.4)
GLUCOSE SERPL-MCNC: 107 MG/DL — HIGH (ref 70–99)
HCT VFR BLD CALC: 40 % — SIGNIFICANT CHANGE UP (ref 39–50)
HGB BLD-MCNC: 12.5 G/DL — LOW (ref 13–17)
MCHC RBC-ENTMCNC: 30.2 PG — SIGNIFICANT CHANGE UP (ref 27–34)
MCHC RBC-ENTMCNC: 31.3 GM/DL — LOW (ref 32–36)
MCV RBC AUTO: 96.6 FL — SIGNIFICANT CHANGE UP (ref 80–100)
NRBC # BLD: 0 /100 WBCS — SIGNIFICANT CHANGE UP (ref 0–0)
PLATELET # BLD AUTO: 276 K/UL — SIGNIFICANT CHANGE UP (ref 150–400)
POTASSIUM SERPL-MCNC: 4.1 MMOL/L — SIGNIFICANT CHANGE UP (ref 3.5–5.3)
POTASSIUM SERPL-SCNC: 4.1 MMOL/L — SIGNIFICANT CHANGE UP (ref 3.5–5.3)
PROT SERPL-MCNC: 7.8 G/DL — SIGNIFICANT CHANGE UP (ref 6–8.3)
RBC # BLD: 4.14 M/UL — LOW (ref 4.2–5.8)
RBC # FLD: 12.6 % — SIGNIFICANT CHANGE UP (ref 10.3–14.5)
SODIUM SERPL-SCNC: 140 MMOL/L — SIGNIFICANT CHANGE UP (ref 135–145)
WBC # BLD: 5.13 K/UL — SIGNIFICANT CHANGE UP (ref 3.8–10.5)
WBC # FLD AUTO: 5.13 K/UL — SIGNIFICANT CHANGE UP (ref 3.8–10.5)

## 2020-03-31 RX ADMIN — ZINC SULFATE TAB 220 MG (50 MG ZINC EQUIVALENT) 220 MILLIGRAM(S): 220 (50 ZN) TAB at 17:48

## 2020-03-31 RX ADMIN — ENOXAPARIN SODIUM 40 MILLIGRAM(S): 100 INJECTION SUBCUTANEOUS at 05:00

## 2020-03-31 RX ADMIN — ENOXAPARIN SODIUM 40 MILLIGRAM(S): 100 INJECTION SUBCUTANEOUS at 17:48

## 2020-03-31 RX ADMIN — Medication 1000 MILLIGRAM(S): at 12:51

## 2020-03-31 NOTE — PROGRESS NOTE ADULT - ASSESSMENT
54y M with medical hx of Morbid Obesity, HTN presents to ED c/o 5 days of low grade fever (TMAX: 100.0F) with generalized malaise, increasing dry cough, and SOB, admitted w/ acute hypoxia 2/2 COVID 19     Problem/Plan - 1:  ·  Problem: Coronavirus infection.    - Patient w/ COVID 19 confirmed by PCR, CXR w/ opacities in right lung  - c/w plaquenil x 5 days  - weaned from 5LNC to 4LNC; holding off on enrolling in clinical trial as improving currently; continue to monitor      Problem/Plan - 2:  ·  Problem: Acute respiratory failure with hypoxia.     -currently on 4L NC, will continue to monitor, wean as needed  -management as above.      Problem/Plan - 3:  ·  Problem: Pneumonia, viral.    -management as above.      Problem/Plan - 4:  ·  Problem: Essential hypertension.    -will hold home anti-htn for now, BPs stable.      Problem/Plan - 5:  ·  Problem: Constipation.  Plan: -bowel regimen.      Problem/Plan - 6:  Problem: Morbid obesity with BMI of 50.0-59.9, adult. Plan: -patient w/ morbid obesity, no formal diagnosis of sleep apnea, does not use any type of mask at home.     Problem/Plan - 7:  ·  Problem: Nutrition, metabolism, and development symptoms.  Plan: -regular diet  -lovenox.

## 2020-03-31 NOTE — PROGRESS NOTE ADULT - SUBJECTIVE AND OBJECTIVE BOX
Patient is a 54y old  Male who presents with a chief complaint of fever, cough, sob (28 Mar 2020 13:52)    SUBJECTIVE / OVERNIGHT EVENTS: no acute events overnight, reports feeling better and able to ambulate without dyspnea while on NC to the bathroom.     Vital Signs Last 24 Hrs  T(C): 36.5 (31 Mar 2020 11:33), Max: 37.1 (30 Mar 2020 21:13)  T(F): 97.7 (31 Mar 2020 11:33), Max: 98.8 (30 Mar 2020 21:13)  HR: 69 (31 Mar 2020 11:33) (69 - 70)  BP: 107/72 (31 Mar 2020 11:33) (107/72 - 115/76)  BP(mean): --  RR: 16 (31 Mar 2020 17:55) (16 - 20)  SpO2: 96% (31 Mar 2020 17:55) (96% - 96%)    PHYSICAL EXAM:  GENERAL: NAD  EYES: conjunctiva and sclera clear  NECK: Supple, No JVD  CHEST/LUNG: Clear, no wheezing, decreased air movement b/l  HEART: +S1/S2, reg   ABDOMEN: Soft, Nontender  PSYCH: AAOx3    LABS:                         12.5   5.13  )-----------( 276      ( 31 Mar 2020 05:37 )             40.0       03-31    140  |  102  |  14  ----------------------------<  107<H>  4.1   |  26  |  0.62    Ca    8.6      31 Mar 2020 05:37    TPro  7.8  /  Alb  3.1<L>  /  TBili  0.4  /  DBili  x   /  AST  43<H>  /  ALT  80<H>  /  AlkPhos  52  03-31

## 2020-04-01 LAB
ALBUMIN SERPL ELPH-MCNC: 3 G/DL — LOW (ref 3.3–5)
ALP SERPL-CCNC: 47 U/L — SIGNIFICANT CHANGE UP (ref 40–120)
ALT FLD-CCNC: 84 U/L — HIGH (ref 10–45)
ANION GAP SERPL CALC-SCNC: 10 MMOL/L — SIGNIFICANT CHANGE UP (ref 5–17)
AST SERPL-CCNC: 47 U/L — HIGH (ref 10–40)
BILIRUB SERPL-MCNC: 0.3 MG/DL — SIGNIFICANT CHANGE UP (ref 0.2–1.2)
BUN SERPL-MCNC: 11 MG/DL — SIGNIFICANT CHANGE UP (ref 7–23)
CALCIUM SERPL-MCNC: 8.7 MG/DL — SIGNIFICANT CHANGE UP (ref 8.4–10.5)
CHLORIDE SERPL-SCNC: 103 MMOL/L — SIGNIFICANT CHANGE UP (ref 96–108)
CO2 SERPL-SCNC: 28 MMOL/L — SIGNIFICANT CHANGE UP (ref 22–31)
CREAT SERPL-MCNC: 0.59 MG/DL — SIGNIFICANT CHANGE UP (ref 0.5–1.3)
CRP SERPL-MCNC: 1.37 MG/DL — HIGH (ref 0–0.4)
GLUCOSE SERPL-MCNC: 102 MG/DL — HIGH (ref 70–99)
HCT VFR BLD CALC: 37 % — LOW (ref 39–50)
HGB BLD-MCNC: 11.5 G/DL — LOW (ref 13–17)
MAGNESIUM SERPL-MCNC: 2.3 MG/DL — SIGNIFICANT CHANGE UP (ref 1.6–2.6)
MCHC RBC-ENTMCNC: 29.9 PG — SIGNIFICANT CHANGE UP (ref 27–34)
MCHC RBC-ENTMCNC: 31.1 GM/DL — LOW (ref 32–36)
MCV RBC AUTO: 96.4 FL — SIGNIFICANT CHANGE UP (ref 80–100)
NRBC # BLD: 0 /100 WBCS — SIGNIFICANT CHANGE UP (ref 0–0)
PHOSPHATE SERPL-MCNC: 3.3 MG/DL — SIGNIFICANT CHANGE UP (ref 2.5–4.5)
PLATELET # BLD AUTO: 286 K/UL — SIGNIFICANT CHANGE UP (ref 150–400)
POTASSIUM SERPL-MCNC: 4.4 MMOL/L — SIGNIFICANT CHANGE UP (ref 3.5–5.3)
POTASSIUM SERPL-SCNC: 4.4 MMOL/L — SIGNIFICANT CHANGE UP (ref 3.5–5.3)
PROT SERPL-MCNC: 7.5 G/DL — SIGNIFICANT CHANGE UP (ref 6–8.3)
RBC # BLD: 3.84 M/UL — LOW (ref 4.2–5.8)
RBC # FLD: 12.5 % — SIGNIFICANT CHANGE UP (ref 10.3–14.5)
SODIUM SERPL-SCNC: 141 MMOL/L — SIGNIFICANT CHANGE UP (ref 135–145)
WBC # BLD: 6.02 K/UL — SIGNIFICANT CHANGE UP (ref 3.8–10.5)
WBC # FLD AUTO: 6.02 K/UL — SIGNIFICANT CHANGE UP (ref 3.8–10.5)

## 2020-04-01 RX ADMIN — ENOXAPARIN SODIUM 40 MILLIGRAM(S): 100 INJECTION SUBCUTANEOUS at 06:35

## 2020-04-01 RX ADMIN — ENOXAPARIN SODIUM 40 MILLIGRAM(S): 100 INJECTION SUBCUTANEOUS at 17:33

## 2020-04-01 RX ADMIN — ZINC SULFATE TAB 220 MG (50 MG ZINC EQUIVALENT) 220 MILLIGRAM(S): 220 (50 ZN) TAB at 11:22

## 2020-04-01 RX ADMIN — Medication 1000 MILLIGRAM(S): at 11:22

## 2020-04-01 NOTE — DISCHARGE NOTE PROVIDER - CARE PROVIDER_API CALL
Mateo Hong)  Cardiovascular Disease; Internal Medicine  [facility] [address] [place] [id number]  Phone: [phone]  Fax: [phone]  Follow Up Time:

## 2020-04-01 NOTE — DISCHARGE NOTE PROVIDER - HOSPITAL COURSE
54y M with medical hx of Morbid Obesity, HTN presents to ED c/o 5 days of low grade fever (TMAX: 100.0F) with generalized malaise, increasing dry cough, and SOB. He states it started with fevers and chills, and yesterday started getting sob prompting him to come to the ED. Denies CP, abdominal pain, palpitations, changes in bowel habits. He has been isolating himself, lives with his wife. 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 He was a former smoker, quit ten years ago, smoked 1PPD x 15 years. Now he does intermittently vape.         Tested for COVID-19 and was positive for the novel virus. Patient remained on oxygen supplementation during hospital course and is inflammatory markers were evaluated and downtrended. Patient was able to be weaned down on the nasal cannula and saturation today (4/1/20) were 96% on 2 liters nasal cannula. Will continue to titrate off oxygen and evaluate how patient's oxygenation. 54y M with medical hx of Morbid Obesity, HTN presents to ED c/o 5 days of low grade fever (TMAX: 100.0F) with generalized malaise, increasing dry cough, and SOB. He states it started with fevers and chills, and yesterday started getting sob prompting him to come to the ED. Denies CP, abdominal pain, palpitations, changes in bowel habits. He has been isolating himself, lives with his wife. 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 He was a former smoker, quit ten years ago, smoked 1PPD x 15 years. Now he does intermittently vape.         Tested for COVID-19 and was positive for the novel virus. Patient remained on oxygen supplementation during hospital course and is inflammatory markers were evaluated and downtrended. Patient was able to be weaned down on the nasal cannula and saturation today (4/1/20) were 96% on 2 liters nasal cannula. Will continue to titrate off oxygen and evaluate how patient's oxygenation. 54y M with medical hx of Morbid Obesity, HTN presents to ED c/o 5 days of low grade fever (TMAX: 100.0F) with generalized malaise, increasing dry cough, and SOB. He states it started with fevers and chills, and yesterday started getting sob prompting him to come to the ED. Denies CP, abdominal pain, palpitations, changes in bowel habits. He has been isolating himself, lives with his wife. 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 He was a former smoker, quit ten years ago, smoked 1PPD x 15 years. Now he does intermittently vape.         Tested for COVID-19 and was positive for the novel virus. Patient remained on oxygen supplementation during hospital course and is inflammatory markers were evaluated and downtrended. Patient was able to be weaned down on the nasal cannula and saturation today (4/1/20) were 96% on 2 liters nasal cannula. Will continue to titrate off oxygen and evaluate how patient's oxygenation. On day of discharge pt was breathing comfortably on room air and stable for discharge home.

## 2020-04-01 NOTE — DISCHARGE NOTE PROVIDER - NSDCMRMEDTOKEN_GEN_ALL_CORE_FT
Norvasc 10 mg oral tablet: 1 tab(s) orally once a day acetaminophen 325 mg oral tablet: 2 tab(s) orally every 6 hours, As needed, Temp greater or equal to 38C (100.4F)  Norvasc 10 mg oral tablet: 1 tab(s) orally once a day

## 2020-04-01 NOTE — PROGRESS NOTE ADULT - ASSESSMENT
54y M with medical hx of Morbid Obesity, HTN presents to ED c/o 5 days of low grade fever (TMAX: 100.0F) with generalized malaise, increasing dry cough, and SOB, admitted w/ acute hypoxia 2/2 COVID 19     Problem/Plan - 1:  ·  Problem: Coronavirus infection.    - Patient w/ COVID 19 confirmed by PCR, CXR w/ opacities in right lung  - completed plaquenil x 5 days  - weaned from 5LNC to 4LNC to 3LNC; holding off on enrolling in clinical trial as improving currently; continue to monitor   - trend ESR, CRP, ferritin, procalcitonin     Problem/Plan - 2:  ·  Problem: Acute respiratory failure with hypoxia.     -currently on 4L NC, will continue to monitor, wean as needed  -management as above.      Problem/Plan - 3:  ·  Problem: Pneumonia, viral.    -management as above.      Problem/Plan - 4:  ·  Problem: Essential hypertension.    -will hold home anti-htn for now, BPs stable.      Problem/Plan - 5:  ·  Problem: Constipation.  Plan: -bowel regimen.      Problem/Plan - 6:  Problem: Morbid obesity with BMI of 50.0-59.9, adult. Plan: -patient w/ morbid obesity, no formal diagnosis of sleep apnea, does not use any type of mask at home.     Problem/Plan - 7:  ·  Problem: Nutrition, metabolism, and development symptoms.  Plan: -regular diet  -lovenox.

## 2020-04-01 NOTE — DISCHARGE NOTE PROVIDER - NSDCCPCAREPLAN_GEN_ALL_CORE_FT
PRINCIPAL DISCHARGE DIAGNOSIS  Diagnosis: Pneumonia  Assessment and Plan of Treatment: 2/2 to novel virus COVID-19. Improved oxygenation,.      SECONDARY DISCHARGE DIAGNOSES  Diagnosis: Nutrition, metabolism, and development symptoms  Assessment and Plan of Treatment: Nutrition, metabolism, and development symptoms    Diagnosis: Essential hypertension  Assessment and Plan of Treatment: Continue norvasc. PRINCIPAL DISCHARGE DIAGNOSIS  Diagnosis: Pneumonia  Assessment and Plan of Treatment: 2/2 to novel virus COVID-19. Improved oxygenation  Complete abx as recommended  Quarentine yourself for 14 days   Please follow up with your PCP in 1-2 weeks -Call your Provider before hand to make then aware of your hospitalization   Take Tylenol for Fevers every 6 hours as needed- Do not exceed 4gm of Tylenol in a 24 hour period  Stay hydrated   WEAR A FACE MASK   Cover your cough and sneezes   Clean your hands often   Avoid sharing personal house hold items   Clean all high touch surfaces- everyday items like table tops , door knobs, cell phones etc   You should restrict activities outside your home except for getting medical care   Avoid using public transportation  Do not go to work, school, or Public areas   Monitor your oxygen saturation         SECONDARY DISCHARGE DIAGNOSES  Diagnosis: Essential hypertension  Assessment and Plan of Treatment: Continue norvasc.    Diagnosis: Nutrition, metabolism, and development symptoms  Assessment and Plan of Treatment: Nutrition, metabolism, and development symptoms

## 2020-04-01 NOTE — PROGRESS NOTE ADULT - SUBJECTIVE AND OBJECTIVE BOX
Patient is a 54y old  Male who presents with a chief complaint of fever, cough, sob (28 Mar 2020 13:52)    SUBJECTIVE / OVERNIGHT EVENTS: no acute events overnight, reports feeling better.    Vital Signs Last 24 Hrs  T(C): 36.5 (31 Mar 2020 11:33), Max: 37.1 (30 Mar 2020 21:13)  T(F): 97.7 (31 Mar 2020 11:33), Max: 98.8 (30 Mar 2020 21:13)  HR: 69 (31 Mar 2020 11:33) (69 - 70)  BP: 107/72 (31 Mar 2020 11:33) (107/72 - 115/76)  BP(mean): --  RR: 16 (31 Mar 2020 17:55) (16 - 20)  SpO2: 96% (31 Mar 2020 17:55) (96% - 96%)    PHYSICAL EXAM:  GENERAL: NAD  EYES: conjunctiva and sclera clear  NECK: Supple, No JVD  CHEST/LUNG: Clear, no wheezing, decreased air movement b/l  HEART: +S1/S2, reg   ABDOMEN: Soft, Nontender  PSYCH: AAOx3    LABS:                         12.5   5.13  )-----------( 276      ( 31 Mar 2020 05:37 )             40.0       03-31    140  |  102  |  14  ----------------------------<  107<H>  4.1   |  26  |  0.62    Ca    8.6      31 Mar 2020 05:37    TPro  7.8  /  Alb  3.1<L>  /  TBili  0.4  /  DBili  x   /  AST  43<H>  /  ALT  80<H>  /  AlkPhos  52  03-31

## 2020-04-02 LAB
ALBUMIN SERPL ELPH-MCNC: 3.3 G/DL — SIGNIFICANT CHANGE UP (ref 3.3–5)
ALP SERPL-CCNC: 50 U/L — SIGNIFICANT CHANGE UP (ref 40–120)
ALT FLD-CCNC: 89 U/L — HIGH (ref 10–45)
ANION GAP SERPL CALC-SCNC: 16 MMOL/L — SIGNIFICANT CHANGE UP (ref 5–17)
AST SERPL-CCNC: 53 U/L — HIGH (ref 10–40)
BASOPHILS # BLD AUTO: 0.03 K/UL — SIGNIFICANT CHANGE UP (ref 0–0.2)
BASOPHILS NFR BLD AUTO: 0.5 % — SIGNIFICANT CHANGE UP (ref 0–2)
BILIRUB SERPL-MCNC: 0.3 MG/DL — SIGNIFICANT CHANGE UP (ref 0.2–1.2)
BUN SERPL-MCNC: 11 MG/DL — SIGNIFICANT CHANGE UP (ref 7–23)
CALCIUM SERPL-MCNC: 8.9 MG/DL — SIGNIFICANT CHANGE UP (ref 8.4–10.5)
CHLORIDE SERPL-SCNC: 101 MMOL/L — SIGNIFICANT CHANGE UP (ref 96–108)
CO2 SERPL-SCNC: 24 MMOL/L — SIGNIFICANT CHANGE UP (ref 22–31)
CREAT SERPL-MCNC: 0.63 MG/DL — SIGNIFICANT CHANGE UP (ref 0.5–1.3)
CRP SERPL-MCNC: 0.83 MG/DL — HIGH (ref 0–0.4)
EOSINOPHIL # BLD AUTO: 0.19 K/UL — SIGNIFICANT CHANGE UP (ref 0–0.5)
EOSINOPHIL NFR BLD AUTO: 3.1 % — SIGNIFICANT CHANGE UP (ref 0–6)
ERYTHROCYTE [SEDIMENTATION RATE] IN BLOOD: >120 MM/HR — HIGH (ref 0–20)
FERRITIN SERPL-MCNC: 734 NG/ML — HIGH (ref 30–400)
GLUCOSE SERPL-MCNC: 101 MG/DL — HIGH (ref 70–99)
HCT VFR BLD CALC: 38.1 % — LOW (ref 39–50)
HGB BLD-MCNC: 11.9 G/DL — LOW (ref 13–17)
IMM GRANULOCYTES NFR BLD AUTO: 0.8 % — SIGNIFICANT CHANGE UP (ref 0–1.5)
LYMPHOCYTES # BLD AUTO: 2.21 K/UL — SIGNIFICANT CHANGE UP (ref 1–3.3)
LYMPHOCYTES # BLD AUTO: 35.8 % — SIGNIFICANT CHANGE UP (ref 13–44)
MAGNESIUM SERPL-MCNC: 2.2 MG/DL — SIGNIFICANT CHANGE UP (ref 1.6–2.6)
MCHC RBC-ENTMCNC: 30.3 PG — SIGNIFICANT CHANGE UP (ref 27–34)
MCHC RBC-ENTMCNC: 31.2 GM/DL — LOW (ref 32–36)
MCV RBC AUTO: 96.9 FL — SIGNIFICANT CHANGE UP (ref 80–100)
MONOCYTES # BLD AUTO: 0.53 K/UL — SIGNIFICANT CHANGE UP (ref 0–0.9)
MONOCYTES NFR BLD AUTO: 8.6 % — SIGNIFICANT CHANGE UP (ref 2–14)
NEUTROPHILS # BLD AUTO: 3.17 K/UL — SIGNIFICANT CHANGE UP (ref 1.8–7.4)
NEUTROPHILS NFR BLD AUTO: 51.2 % — SIGNIFICANT CHANGE UP (ref 43–77)
NRBC # BLD: 0 /100 WBCS — SIGNIFICANT CHANGE UP (ref 0–0)
PHOSPHATE SERPL-MCNC: 3.8 MG/DL — SIGNIFICANT CHANGE UP (ref 2.5–4.5)
PLATELET # BLD AUTO: 280 K/UL — SIGNIFICANT CHANGE UP (ref 150–400)
POTASSIUM SERPL-MCNC: 4.5 MMOL/L — SIGNIFICANT CHANGE UP (ref 3.5–5.3)
POTASSIUM SERPL-SCNC: 4.5 MMOL/L — SIGNIFICANT CHANGE UP (ref 3.5–5.3)
PROCALCITONIN SERPL-MCNC: 0.06 NG/ML — SIGNIFICANT CHANGE UP (ref 0.02–0.1)
PROT SERPL-MCNC: 7.4 G/DL — SIGNIFICANT CHANGE UP (ref 6–8.3)
RBC # BLD: 3.93 M/UL — LOW (ref 4.2–5.8)
RBC # FLD: 12.6 % — SIGNIFICANT CHANGE UP (ref 10.3–14.5)
SODIUM SERPL-SCNC: 141 MMOL/L — SIGNIFICANT CHANGE UP (ref 135–145)
WBC # BLD: 6.18 K/UL — SIGNIFICANT CHANGE UP (ref 3.8–10.5)
WBC # FLD AUTO: 6.18 K/UL — SIGNIFICANT CHANGE UP (ref 3.8–10.5)

## 2020-04-02 RX ADMIN — ENOXAPARIN SODIUM 40 MILLIGRAM(S): 100 INJECTION SUBCUTANEOUS at 06:31

## 2020-04-02 RX ADMIN — ZINC SULFATE TAB 220 MG (50 MG ZINC EQUIVALENT) 220 MILLIGRAM(S): 220 (50 ZN) TAB at 12:48

## 2020-04-02 RX ADMIN — Medication 1000 MILLIGRAM(S): at 12:48

## 2020-04-02 RX ADMIN — ENOXAPARIN SODIUM 40 MILLIGRAM(S): 100 INJECTION SUBCUTANEOUS at 17:25

## 2020-04-02 NOTE — PROGRESS NOTE ADULT - SUBJECTIVE AND OBJECTIVE BOX
Patient is a 54y old  Male who presents with a chief complaint of fever, cough, sob (28 Mar 2020 13:52)    SUBJECTIVE / OVERNIGHT EVENTS: no acute events overnight, reports feeling better. Off NC oxygen.    Vital Signs Last 24 Hrs  T(C): 36.5 (31 Mar 2020 11:33), Max: 37.1 (30 Mar 2020 21:13)  T(F): 97.7 (31 Mar 2020 11:33), Max: 98.8 (30 Mar 2020 21:13)  HR: 69 (31 Mar 2020 11:33) (69 - 70)  BP: 107/72 (31 Mar 2020 11:33) (107/72 - 115/76)  BP(mean): --  RR: 16 (31 Mar 2020 17:55) (16 - 20)  SpO2: 96% (31 Mar 2020 17:55) (96% - 96%)    PHYSICAL EXAM:  GENERAL: NAD  EYES: conjunctiva and sclera clear  NECK: Supple, No JVD  CHEST/LUNG: Clear, no wheezing, decreased air movement b/l  HEART: +S1/S2, reg   ABDOMEN: Soft, Nontender  PSYCH: AAOx3    LABS:                         12.5   5.13  )-----------( 276      ( 31 Mar 2020 05:37 )             40.0       03-31    140  |  102  |  14  ----------------------------<  107<H>  4.1   |  26  |  0.62    Ca    8.6      31 Mar 2020 05:37    TPro  7.8  /  Alb  3.1<L>  /  TBili  0.4  /  DBili  x   /  AST  43<H>  /  ALT  80<H>  /  AlkPhos  52  03-31

## 2020-04-02 NOTE — PROGRESS NOTE ADULT - ASSESSMENT
54y M with medical hx of Morbid Obesity, HTN presents to ED c/o 5 days of low grade fever (TMAX: 100.0F) with generalized malaise, increasing dry cough, and SOB, admitted w/ acute hypoxia 2/2 COVID 19     Problem/Plan - 1:  ·  Problem: Coronavirus infection.    - Patient w/ COVID 19 confirmed by PCR, CXR w/ opacities in right lung  - completed plaquenil x 5 days  - weaned down to RA  - trend ESR, CRP, ferritin, procalcitonin     Problem/Plan - 2:  ·  Problem: Acute respiratory failure with hypoxia.     -now on RA  -management as above.      Problem/Plan - 3:  ·  Problem: Pneumonia, viral.    -management as above.      Problem/Plan - 4:  ·  Problem: Essential hypertension.    -will hold home anti-htn for now, BPs stable.      Problem/Plan - 5:  ·  Problem: Constipation.  Plan: -bowel regimen.      Problem/Plan - 6:  Problem: Morbid obesity with BMI of 50.0-59.9, adult. Plan: -patient w/ morbid obesity, no formal diagnosis of sleep apnea, does not use any type of mask at home.     Problem/Plan - 7:  ·  Problem: Nutrition, metabolism, and development symptoms.  Plan: -regular diet  -lovenox.

## 2020-04-03 VITALS
DIASTOLIC BLOOD PRESSURE: 75 MMHG | OXYGEN SATURATION: 94 % | TEMPERATURE: 98 F | RESPIRATION RATE: 18 BRPM | SYSTOLIC BLOOD PRESSURE: 112 MMHG | HEART RATE: 64 BPM

## 2020-04-03 PROCEDURE — 86140 C-REACTIVE PROTEIN: CPT

## 2020-04-03 PROCEDURE — 83605 ASSAY OF LACTIC ACID: CPT

## 2020-04-03 PROCEDURE — 84295 ASSAY OF SERUM SODIUM: CPT

## 2020-04-03 PROCEDURE — 80053 COMPREHEN METABOLIC PANEL: CPT

## 2020-04-03 PROCEDURE — 86803 HEPATITIS C AB TEST: CPT

## 2020-04-03 PROCEDURE — 83615 LACTATE (LD) (LDH) ENZYME: CPT

## 2020-04-03 PROCEDURE — 82803 BLOOD GASES ANY COMBINATION: CPT

## 2020-04-03 PROCEDURE — 84132 ASSAY OF SERUM POTASSIUM: CPT

## 2020-04-03 PROCEDURE — 85014 HEMATOCRIT: CPT

## 2020-04-03 PROCEDURE — 83735 ASSAY OF MAGNESIUM: CPT

## 2020-04-03 PROCEDURE — 93005 ELECTROCARDIOGRAM TRACING: CPT

## 2020-04-03 PROCEDURE — 84145 PROCALCITONIN (PCT): CPT

## 2020-04-03 PROCEDURE — 85027 COMPLETE CBC AUTOMATED: CPT

## 2020-04-03 PROCEDURE — 82550 ASSAY OF CK (CPK): CPT

## 2020-04-03 PROCEDURE — 85379 FIBRIN DEGRADATION QUANT: CPT

## 2020-04-03 PROCEDURE — 82728 ASSAY OF FERRITIN: CPT

## 2020-04-03 PROCEDURE — 82330 ASSAY OF CALCIUM: CPT

## 2020-04-03 PROCEDURE — 85652 RBC SED RATE AUTOMATED: CPT

## 2020-04-03 PROCEDURE — 82435 ASSAY OF BLOOD CHLORIDE: CPT

## 2020-04-03 PROCEDURE — 84100 ASSAY OF PHOSPHORUS: CPT

## 2020-04-03 PROCEDURE — 71045 X-RAY EXAM CHEST 1 VIEW: CPT

## 2020-04-03 PROCEDURE — 80048 BASIC METABOLIC PNL TOTAL CA: CPT

## 2020-04-03 PROCEDURE — 82947 ASSAY GLUCOSE BLOOD QUANT: CPT

## 2020-04-03 PROCEDURE — 99285 EMERGENCY DEPT VISIT HI MDM: CPT

## 2020-04-03 RX ORDER — ACETAMINOPHEN 500 MG
2 TABLET ORAL
Qty: 0 | Refills: 0 | DISCHARGE
Start: 2020-04-03

## 2020-04-03 RX ADMIN — ENOXAPARIN SODIUM 40 MILLIGRAM(S): 100 INJECTION SUBCUTANEOUS at 05:41

## 2020-04-03 RX ADMIN — Medication 1000 MILLIGRAM(S): at 12:53

## 2020-04-03 NOTE — PROGRESS NOTE ADULT - ASSESSMENT
54y M with medical hx of Morbid Obesity, HTN presents to ED c/o 5 days of low grade fever (TMAX: 100.0F) with generalized malaise, increasing dry cough, and SOB, admitted w/ acute hypoxia 2/2 COVID 19     Problem/Plan - 1:  ·  Problem: Coronavirus infection.    - Patient w/ COVID 19 confirmed by PCR, CXR w/ opacities in right lung  - completed plaquenil x 5 days  - weaned down to RA  - trend ESR, CRP, ferritin, procalcitonin  - for discharge today, counseled patient on needing to come back to ER if develops fever, worsening SOB, cough, sputum     Problem/Plan - 2:  ·  Problem: Acute respiratory failure with hypoxia.     -now on RA  -management as above.      Problem/Plan - 3:  ·  Problem: Pneumonia, viral.    -management as above.      Problem/Plan - 4:  ·  Problem: Essential hypertension.    -will hold home anti-htn for now, BPs stable.      Problem/Plan - 5:  ·  Problem: Constipation.  Plan: -bowel regimen.      Problem/Plan - 6:  Problem: Morbid obesity with BMI of 50.0-59.9, adult. Plan: -patient w/ morbid obesity, no formal diagnosis of sleep apnea, does not use any type of mask at home.     Problem/Plan - 7:  ·  Problem: Nutrition, metabolism, and development symptoms.  Plan: -regular diet  -lovenox.

## 2020-04-03 NOTE — PROGRESS NOTE ADULT - REASON FOR ADMISSION
fever, cough, sob

## 2020-04-03 NOTE — PROGRESS NOTE ADULT - SUBJECTIVE AND OBJECTIVE BOX
Patient is a 54y old  Male who presents with a chief complaint of fever, cough, sob (28 Mar 2020 13:52)    SUBJECTIVE / OVERNIGHT EVENTS: no acute events overnight, has been off supplemental oxygen for 24 hours now.    Vital Signs Last 24 Hrs  T(C): 36.5 (31 Mar 2020 11:33), Max: 37.1 (30 Mar 2020 21:13)  T(F): 97.7 (31 Mar 2020 11:33), Max: 98.8 (30 Mar 2020 21:13)  HR: 69 (31 Mar 2020 11:33) (69 - 70)  BP: 107/72 (31 Mar 2020 11:33) (107/72 - 115/76)  BP(mean): --  RR: 16 (31 Mar 2020 17:55) (16 - 20)  SpO2: 96% (31 Mar 2020 17:55) (96% - 96%)    PHYSICAL EXAM:  GENERAL: NAD  EYES: conjunctiva and sclera clear  NECK: Supple, No JVD  CHEST/LUNG: Clear, no wheezing, decreased air movement b/l  HEART: +S1/S2, reg   ABDOMEN: Soft, Nontender  PSYCH: AAOx3    LABS:                         12.5   5.13  )-----------( 276      ( 31 Mar 2020 05:37 )             40.0       03-31    140  |  102  |  14  ----------------------------<  107<H>  4.1   |  26  |  0.62    Ca    8.6      31 Mar 2020 05:37    TPro  7.8  /  Alb  3.1<L>  /  TBili  0.4  /  DBili  x   /  AST  43<H>  /  ALT  80<H>  /  AlkPhos  52  03-31

## 2020-04-03 NOTE — DISCHARGE NOTE NURSING/CASE MANAGEMENT/SOCIAL WORK - PATIENT PORTAL LINK FT
You can access the FollowMyHealth Patient Portal offered by [facility] by registering at the following website: [email or web address]. By joining [facility]’s FollowMyHealth portal, you will also be able to view your health information using other applications (apps) compatible with our system.

## 2020-04-03 NOTE — PROGRESS NOTE ADULT - NSREFPHYEXREFERTOOTHER_GEN_ALL_CORE
physical exam as performed above

## 2022-03-30 NOTE — DIETITIAN INITIAL EVALUATION ADULT. - PROBLEM SELECTOR PLAN 6
Fax received from [facility] medical requesting form and signature by Yulissa Montelongo for [employer] [place] [facility].    Form completed and signed by Yulissa.    Form faxed.  Confirmation received.  
-regular diet  -lovenox

## 2023-11-03 NOTE — DISCHARGE NOTE PROVIDER - NSDCACTIVITY_GEN_ALL_CORE
Noted, will watch for update on plan (hospice)  HF updates are already in process for him at [facility] ([place]   No restrictions

## 2024-08-29 NOTE — PROGRESS NOTE ADULT - PROBLEM SELECTOR PROBLEM 3
Pneumonia, viral
[Negative] : Heme/Lymph
